# Patient Record
Sex: FEMALE | Race: WHITE | Employment: FULL TIME | URBAN - METROPOLITAN AREA
[De-identification: names, ages, dates, MRNs, and addresses within clinical notes are randomized per-mention and may not be internally consistent; named-entity substitution may affect disease eponyms.]

---

## 2024-07-08 ENCOUNTER — HOSPITAL ENCOUNTER (EMERGENCY)
Facility: HOSPITAL | Age: 24
Discharge: HOME | End: 2024-07-08
Attending: STUDENT IN AN ORGANIZED HEALTH CARE EDUCATION/TRAINING PROGRAM
Payer: COMMERCIAL

## 2024-07-08 VITALS
HEIGHT: 66 IN | DIASTOLIC BLOOD PRESSURE: 83 MMHG | WEIGHT: 165 LBS | HEART RATE: 105 BPM | RESPIRATION RATE: 18 BRPM | SYSTOLIC BLOOD PRESSURE: 128 MMHG | TEMPERATURE: 98.7 F | BODY MASS INDEX: 26.52 KG/M2 | OXYGEN SATURATION: 98 %

## 2024-07-08 DIAGNOSIS — K64.4 EXTERNAL HEMORRHOIDS: Primary | ICD-10-CM

## 2024-07-08 PROCEDURE — 99283 EMERGENCY DEPT VISIT LOW MDM: CPT

## 2024-07-08 RX ORDER — POLYETHYLENE GLYCOL 3350 17 G/17G
17 POWDER, FOR SOLUTION ORAL 2 TIMES DAILY
Qty: 527 G | Refills: 1 | Status: SHIPPED | OUTPATIENT
Start: 2024-07-08 | End: 2024-08-08

## 2024-07-08 RX ORDER — SENNOSIDES 8.6 MG
1 CAPSULE ORAL 2 TIMES DAILY
Qty: 60 CAPSULE | Refills: 0 | Status: SHIPPED | OUTPATIENT
Start: 2024-07-08

## 2024-07-08 ASSESSMENT — ENCOUNTER SYMPTOMS
NAUSEA: 1
EYES NEGATIVE: 1
CONSTIPATION: 1
RESPIRATORY NEGATIVE: 1
BLOOD IN STOOL: 1
DIARRHEA: 0
RECTAL PAIN: 1
VOMITING: 0
ABDOMINAL PAIN: 1

## 2024-07-08 ASSESSMENT — PAIN SCALES - GENERAL: PAINLEVEL_OUTOF10: 6

## 2024-07-08 ASSESSMENT — PAIN DESCRIPTION - LOCATION: LOCATION: ABDOMEN

## 2024-07-08 ASSESSMENT — PAIN - FUNCTIONAL ASSESSMENT: PAIN_FUNCTIONAL_ASSESSMENT: 0-10

## 2024-07-08 ASSESSMENT — PAIN DESCRIPTION - DESCRIPTORS: DESCRIPTORS: ACHING

## 2024-07-08 NOTE — ED PROVIDER NOTES
[facility] EMERGENCY DEPT  EMERGENCY DEPARTMENT ENCOUNTER      Pt Name: Sienna Encarnacion  MRN: [id number]  Birthdate 2000  Date of evaluation: 7/8/2024  Provider: Tia Lawler MD    CHIEF COMPLAINT       Chief Complaint   Patient presents with    Hemorrhoids         HISTORY OF PRESENT ILLNESS   (Location/Symptom, Timing/Onset, Context/Setting, Quality, Duration, Modifying Factors, Severity)  Note limiting factors.   Patient is a 24-year-old female with past medical history of endometriosis who presents to the ED due to rectal bleeding for the past month.  Patient reports bleeding started suddenly and has been progressively getting worse for the past month.  Patient has a bowel movement there is a lot of blood in the toilet bowl and blood on toilet paper when patient wipes.  Patient reports started taking laxatives 2 days ago due to constipation.  Patient reports constipation is likely secondary to not wanting to use restroom due to pain with bowel movement.  Patient reports crampy abdominal pain which is likely due to constipation.  Patient reports never had such rectal bleeding in the past.  Patient noticed a skin tag on self-examination.  Patient denies any any medical GI history including GI surgeries.  Denies any fever, chills, vomiting, lightheadedness, dizziness.  Patient endorses nausea this morning but has not eaten breakfast.  This is the first day that patient has been feeling nauseous.    The history is provided by the patient.         Review of External Medical Records:     Nursing Notes were reviewed.    REVIEW OF SYSTEMS    (2-9 systems for level 4, 10 or more for level 5)     Review of Systems   Constitutional: Negative.    HENT: Negative.     Eyes: Negative.    Respiratory: Negative.     Cardiovascular: Negative.    Gastrointestinal:  Positive for abdominal pain (generalized, cramping), blood in stool, constipation, nausea and rectal pain. Negative for diarrhea and vomiting.   Endocrine:

## 2024-07-08 NOTE — ED TRIAGE NOTES
PT ambulatory to ED with complaints of a hemorrhoid with lots of blood while having BM. PT also reports abdominal pain and cramping.

## 2024-08-27 ENCOUNTER — ANCILLARY PROCEDURE (OUTPATIENT)
Facility: HOSPITAL | Age: 24
End: 2024-08-27
Attending: EMERGENCY MEDICINE
Payer: COMMERCIAL

## 2024-08-27 ENCOUNTER — HOSPITAL ENCOUNTER (EMERGENCY)
Facility: HOSPITAL | Age: 24
Discharge: HOME | End: 2024-08-27
Attending: EMERGENCY MEDICINE
Payer: COMMERCIAL

## 2024-08-27 VITALS
WEIGHT: 160 LBS | HEIGHT: 66 IN | TEMPERATURE: 98 F | RESPIRATION RATE: 18 BRPM | OXYGEN SATURATION: 100 % | HEART RATE: 79 BPM | DIASTOLIC BLOOD PRESSURE: 76 MMHG | SYSTOLIC BLOOD PRESSURE: 124 MMHG | BODY MASS INDEX: 25.71 KG/M2

## 2024-08-27 DIAGNOSIS — O46.90 VAGINAL BLEEDING IN PREGNANCY: Primary | ICD-10-CM

## 2024-08-27 DIAGNOSIS — Z3A.01 LESS THAN 8 WEEKS GESTATION OF PREGNANCY: ICD-10-CM

## 2024-08-27 LAB
ABO + RH BLD: NORMAL
ALBUMIN SERPL-MCNC: 3.9 G/DL (ref 3.5–5.2)
ALBUMIN/GLOB SERPL: 1.5 (ref 1.1–2.2)
ALP SERPL-CCNC: 64 U/L (ref 35–104)
ALT SERPL-CCNC: 19 U/L (ref 10–35)
ANION GAP SERPL CALC-SCNC: 13 MMOL/L (ref 5–15)
APPEARANCE UR: CLEAR
AST SERPL-CCNC: 18 U/L (ref 10–35)
BACTERIA URNS QL MICRO: NEGATIVE /HPF
BASOPHILS # BLD: 0 K/UL (ref 0–1)
BASOPHILS NFR BLD: 0 % (ref 0–1)
BILIRUB SERPL-MCNC: 0.5 MG/DL (ref 0.2–1)
BILIRUB UR QL: NEGATIVE
BLOOD BANK CMNT PATIENT-IMP: NORMAL
BUN SERPL-MCNC: 9 MG/DL (ref 6–20)
BUN/CREAT SERPL: 14 (ref 12–20)
CALCIUM SERPL-MCNC: 9.1 MG/DL (ref 8.6–10)
CHLORIDE SERPL-SCNC: 103 MMOL/L (ref 98–107)
CO2 SERPL-SCNC: 24 MMOL/L (ref 22–29)
COLOR UR: ABNORMAL
CREAT SERPL-MCNC: 0.65 MG/DL (ref 0.5–0.9)
DIFFERENTIAL METHOD BLD: ABNORMAL
EOSINOPHIL # BLD: 0 K/UL (ref 0–0.4)
EOSINOPHIL NFR BLD: 1 %
EPITH CASTS URNS QL MICRO: ABNORMAL /LPF
ERYTHROCYTE [DISTWIDTH] IN BLOOD BY AUTOMATED COUNT: 12.7 % (ref 11.5–14.5)
GLOBULIN SER CALC-MCNC: 2.6 G/DL (ref 2–4)
GLUCOSE SERPL-MCNC: 90 MG/DL (ref 65–100)
GLUCOSE UR STRIP.AUTO-MCNC: NEGATIVE MG/DL
HCG SERPL-ACNC: ABNORMAL MIU/ML
HCT VFR BLD AUTO: 37.5 % (ref 35–47)
HGB BLD-MCNC: 13.5 G/DL (ref 11.5–16)
HGB UR QL STRIP: ABNORMAL
IMM GRANULOCYTES # BLD AUTO: 0 K/UL (ref 0–0.04)
IMM GRANULOCYTES NFR BLD AUTO: 0 % (ref 0–0.5)
KETONES UR QL STRIP.AUTO: NEGATIVE MG/DL
LEUKOCYTE ESTERASE UR QL STRIP.AUTO: NEGATIVE
LYMPHOCYTES # BLD: 3.4 K/UL (ref 0.8–3.5)
LYMPHOCYTES NFR BLD: 41 % (ref 12–49)
MCH RBC QN AUTO: 32.1 PG (ref 26–34)
MCHC RBC AUTO-ENTMCNC: 36 G/DL (ref 30–36.5)
MCV RBC AUTO: 89.1 FL (ref 80–99)
MONOCYTES # BLD: 0.6 K/UL (ref 0–1)
MONOCYTES NFR BLD: 7 % (ref 5–13)
NEUTS SEG # BLD: 4.2 K/UL (ref 1.8–8)
NEUTS SEG NFR BLD: 51 % (ref 32–75)
NITRITE UR QL STRIP.AUTO: NEGATIVE
NRBC # BLD: 0 K/UL (ref 0–0.01)
NRBC BLD-RTO: 0 PER 100 WBC
PH UR STRIP: 7 (ref 5–8)
PLATELET # BLD AUTO: 213 K/UL (ref 150–400)
PMV BLD AUTO: 9.9 FL (ref 8.9–12.9)
POTASSIUM SERPL-SCNC: 4 MMOL/L (ref 3.5–5.1)
PROT SERPL-MCNC: 6.5 G/DL (ref 6.4–8.3)
PROT UR STRIP-MCNC: NEGATIVE MG/DL
RBC # BLD AUTO: 4.21 M/UL (ref 3.8–5.2)
RBC #/AREA URNS HPF: ABNORMAL /HPF
SODIUM SERPL-SCNC: 140 MMOL/L (ref 136–145)
SP GR UR REFRACTOMETRY: 1.01 (ref 1–1.03)
UROBILINOGEN UR QL STRIP.AUTO: 0.2 EU/DL (ref 0.2–1)
WBC # BLD AUTO: 8.2 K/UL (ref 3.6–11)
WBC URNS QL MICRO: ABNORMAL /HPF (ref 0–4)

## 2024-08-27 PROCEDURE — 84702 CHORIONIC GONADOTROPIN TEST: CPT

## 2024-08-27 PROCEDURE — 86900 BLOOD TYPING SEROLOGIC ABO: CPT

## 2024-08-27 PROCEDURE — 36415 COLL VENOUS BLD VENIPUNCTURE: CPT

## 2024-08-27 PROCEDURE — 99284 EMERGENCY DEPT VISIT MOD MDM: CPT

## 2024-08-27 PROCEDURE — 80053 COMPREHEN METABOLIC PANEL: CPT

## 2024-08-27 PROCEDURE — 86901 BLOOD TYPING SEROLOGIC RH(D): CPT

## 2024-08-27 PROCEDURE — 81001 URINALYSIS AUTO W/SCOPE: CPT

## 2024-08-27 PROCEDURE — 85025 COMPLETE CBC W/AUTO DIFF WBC: CPT

## 2024-08-27 ASSESSMENT — PAIN DESCRIPTION - DESCRIPTORS: DESCRIPTORS: CRAMPING

## 2024-08-27 ASSESSMENT — PAIN DESCRIPTION - LOCATION: LOCATION: ABDOMEN

## 2024-08-27 ASSESSMENT — PAIN DESCRIPTION - PAIN TYPE: TYPE: ACUTE PAIN

## 2024-08-27 ASSESSMENT — LIFESTYLE VARIABLES
HOW MANY STANDARD DRINKS CONTAINING ALCOHOL DO YOU HAVE ON A TYPICAL DAY: PATIENT DOES NOT DRINK
HOW OFTEN DO YOU HAVE A DRINK CONTAINING ALCOHOL: NEVER

## 2024-08-27 ASSESSMENT — PAIN SCALES - GENERAL: PAINLEVEL_OUTOF10: 2

## 2024-08-27 ASSESSMENT — PAIN - FUNCTIONAL ASSESSMENT: PAIN_FUNCTIONAL_ASSESSMENT: 0-10

## 2024-08-27 NOTE — ED TRIAGE NOTES
Pt arrived to ED with cc of being 6-7 weeks pregnant and experiencing increased watery vaginal bleeding. Reports she has had off and on spotting since beginning of pregnancy but reports this morning increased leaking streak in bottom of toilet. Reports cramping but endorses endometriosis and reports that the cramping feels her normal cramping. Reports pain 2/10. Denies dysuria or hematuria. Reports nausea but not right this minute. Denies vomiting or diarrhea. No fevers, No medications PTA.

## 2024-08-27 NOTE — ED PROVIDER NOTES
[facility] [place] EMERGENCY DEPT  EMERGENCY DEPARTMENT ENCOUNTER      Pt Name: Sienna Encarnacion  MRN: [id number]  Birthdate 2000  Date of evaluation: 8/27/2024  Provider: Tyrone Campos MD    CHIEF COMPLAINT       Chief Complaint   Patient presents with    Vaginal Bleeding       EMERGENCY DEPARTMENT COURSE and DIFFERENTIAL DIAGNOSIS/MDM:   Medical Decision Making  24-year-old female presenting ER with report of being approximately 6 to 7 weeks pregnant based on last menstrual cycle now having some vaginal bleeding.  Patient reports she has had some mild spotting on and off for last couple days and today noticed increased watery streaks of bright red blood.  Denies any excessive vaginal bleeding or passing of clots or tissue.  Having some intermittent abdominal cramping but nothing sustained.  Having no abdominal pain or cramping at this time.  Denies any fevers or chills.  No nausea or vomiting.  Patient has not had an ultrasound for this pregnancy.  Patient reports her blood type is O+.  No previous pregnancies no previous miscarriage.  Does have history of endometriosis.  Reports mild nausea with the pregnancy but no nausea at this time no vomiting or diarrhea.  On exam patient is well-appearing in no acute distress.  No abdominal pain to palpation.  Well-appearing no pallor.  No CVA tenderness.  Bedside ultrasound revealing positive IUP.  Fetal heartbeat can be visualized but difficult to see with measurement.  Normal hemoglobin level.  Beta-hCG an appropriate level.  No signs of urine tract infection.  I discussed patient's symptoms with bleeding in first trimester pregnancy tach no \"diagnosis/concern for threatened miscarriage however patient evaluation is reassuring at this time.  Discussed follow-up with OB/GYN.  Patient stable for discharge    Amount and/or Complexity of Data Reviewed  Labs: ordered. Decision-making details documented in ED Course.  Radiology: ordered and independent interpretation

## 2024-09-05 LAB
ABO, EXTERNAL RESULT: NORMAL
C. TRACHOMATIS, EXTERNAL RESULT: NEGATIVE
HEP B, EXTERNAL RESULT: NEGATIVE
HEPATITIS C ANTIBODY, EXTERNAL RESULT: NORMAL
HIV, EXTERNAL RESULT: NEGATIVE
N. GONORRHOEAE, EXTERNAL RESULT: NEGATIVE
RH FACTOR, EXTERNAL RESULT: POSITIVE
RUBELLA TITER, EXTERNAL RESULT: NORMAL
T. PALLIDUM (SYPHILIS) ANTIBODY, EXTERNAL RESULT: NORMAL

## 2024-11-20 ENCOUNTER — HOSPITAL ENCOUNTER (EMERGENCY)
Facility: HOSPITAL | Age: 24
Discharge: HOME | End: 2024-11-20
Attending: EMERGENCY MEDICINE
Payer: COMMERCIAL

## 2024-11-20 VITALS
HEIGHT: 66 IN | RESPIRATION RATE: 16 BRPM | WEIGHT: 168 LBS | DIASTOLIC BLOOD PRESSURE: 81 MMHG | OXYGEN SATURATION: 100 % | TEMPERATURE: 99.3 F | SYSTOLIC BLOOD PRESSURE: 128 MMHG | BODY MASS INDEX: 27 KG/M2 | HEART RATE: 99 BPM

## 2024-11-20 DIAGNOSIS — B34.9 VIRAL SYNDROME: Primary | ICD-10-CM

## 2024-11-20 DIAGNOSIS — J02.9 ACUTE PHARYNGITIS, UNSPECIFIED ETIOLOGY: ICD-10-CM

## 2024-11-20 LAB
FLUAV RNA SPEC QL NAA+PROBE: NOT DETECTED
FLUBV RNA SPEC QL NAA+PROBE: NOT DETECTED
S PYO DNA THROAT QL NAA+PROBE: NOT DETECTED
SARS-COV-2 RNA RESP QL NAA+PROBE: NOT DETECTED
SOURCE: NORMAL

## 2024-11-20 PROCEDURE — 99283 EMERGENCY DEPT VISIT LOW MDM: CPT

## 2024-11-20 PROCEDURE — 87636 SARSCOV2 & INF A&B AMP PRB: CPT

## 2024-11-20 PROCEDURE — 87651 STREP A DNA AMP PROBE: CPT

## 2024-11-20 ASSESSMENT — PAIN SCALES - GENERAL: PAINLEVEL_OUTOF10: 0

## 2024-11-20 ASSESSMENT — PAIN - FUNCTIONAL ASSESSMENT: PAIN_FUNCTIONAL_ASSESSMENT: 0-10

## 2024-11-20 NOTE — ED TRIAGE NOTES
Patient arrived ambulatory via POV with cc sore throat, generalized body aches, chills that started yesterday. Patient is 18w5d. Denies chest pain, shortness of breath, n/v/d    Patient reports she was exposed to covid within the past week.

## 2024-11-20 NOTE — ED PROVIDER NOTES
[facility] [place] EMERGENCY DEPT  EMERGENCY DEPARTMENT ENCOUNTER      Pt Name: Sienna Encarnacion  MRN: [id number]  Birthdate 2000  Date of evaluation: 11/20/2024  Provider: Roland Armstrong DO    CHIEF COMPLAINT       Chief Complaint   Patient presents with    Sore Throat    Generalized Body Aches         HISTORY OF PRESENT ILLNESS   (Location/Symptom, Timing/Onset, Context/Setting, Quality, Duration, Modifying Factors, Severity)  Note limiting factors.   25 yo female comes in for flu symptoms. 1-2 days of cough, st, subjective fever and generally not feeling well. 18+ weeks pregnant. Was feeling lightheaded at work earlier.             Review of External Medical Records:     Nursing Notes were reviewed.    REVIEW OF SYSTEMS    (2-9 systems for level 4, 10 or more for level 5)     Review of Systems    Except as noted above the remainder of the review of systems was reviewed and negative.       PAST MEDICAL HISTORY   No past medical history on file.      SURGICAL HISTORY     No past surgical history on file.      CURRENT MEDICATIONS       Discharge Medication List as of 11/20/2024  3:15 PM        CONTINUE these medications which have NOT CHANGED    Details   Sennosides (SENNA) 8.6 MG CAPS Take 1 tablet by mouth in the morning and at bedtime Wean down as tolerated to keep bowel movement soft, Disp-60 capsule, R-0Normal      Lidocaine-Glycerin 5-14.4 % CREA Apply cream to rectum 5-6 times per day as needed for pain, Disp-30 g, R-0Normal      ibuprofen (ADVIL;MOTRIN) 600 MG tablet Take 1 tablet by mouth every 8 hours as needed for Pain, Disp-60 tablet, R-0Normal             ALLERGIES     Patient has no known allergies.    FAMILY HISTORY     No family history on file.       SOCIAL HISTORY       Social History     Socioeconomic History    Marital status: Single   Tobacco Use    Smoking status: Former     Types: Cigarettes    Smokeless tobacco: Never   Vaping Use    Vaping status: Every Day    Substances: Nicotine

## 2025-02-19 ENCOUNTER — HOSPITAL ENCOUNTER (OUTPATIENT)
Facility: HOSPITAL | Age: 25
Setting detail: OBSERVATION
Discharge: HOME | End: 2025-02-19
Attending: STUDENT IN AN ORGANIZED HEALTH CARE EDUCATION/TRAINING PROGRAM | Admitting: STUDENT IN AN ORGANIZED HEALTH CARE EDUCATION/TRAINING PROGRAM
Payer: MEDICAID

## 2025-02-19 VITALS
DIASTOLIC BLOOD PRESSURE: 74 MMHG | SYSTOLIC BLOOD PRESSURE: 133 MMHG | RESPIRATION RATE: 16 BRPM | HEART RATE: 113 BPM | TEMPERATURE: 98.1 F | BODY MASS INDEX: 30.02 KG/M2 | OXYGEN SATURATION: 99 % | WEIGHT: 186 LBS

## 2025-02-19 LAB
APPEARANCE UR: CLEAR
BACTERIA URNS QL MICRO: NEGATIVE /HPF
BILIRUB UR QL: NEGATIVE
COLOR UR: ABNORMAL
EPITH CASTS URNS QL MICRO: ABNORMAL /LPF
GLUCOSE UR STRIP.AUTO-MCNC: NEGATIVE MG/DL
HGB UR QL STRIP: NEGATIVE
KETONES UR QL STRIP.AUTO: NEGATIVE MG/DL
LEUKOCYTE ESTERASE UR QL STRIP.AUTO: NEGATIVE
NITRITE UR QL STRIP.AUTO: NEGATIVE
PH UR STRIP: 7.5 (ref 5–8)
PROT UR STRIP-MCNC: NEGATIVE MG/DL
RBC #/AREA URNS HPF: ABNORMAL /HPF (ref 0–5)
SP GR UR REFRACTOMETRY: 1.02 (ref 1–1.03)
URINE CULTURE IF INDICATED: ABNORMAL
UROBILINOGEN UR QL STRIP.AUTO: 0.2 EU/DL (ref 0.2–1)
WBC URNS QL MICRO: ABNORMAL /HPF (ref 0–4)

## 2025-02-19 PROCEDURE — 2580000003 HC RX 258: Performed by: OBSTETRICS & GYNECOLOGY

## 2025-02-19 PROCEDURE — 81001 URINALYSIS AUTO W/SCOPE: CPT

## 2025-02-19 PROCEDURE — 6360000002 HC RX W HCPCS: Performed by: OBSTETRICS & GYNECOLOGY

## 2025-02-19 PROCEDURE — 96360 HYDRATION IV INFUSION INIT: CPT

## 2025-02-19 PROCEDURE — G0378 HOSPITAL OBSERVATION PER HR: HCPCS

## 2025-02-19 PROCEDURE — 96372 THER/PROPH/DIAG INJ SC/IM: CPT

## 2025-02-19 PROCEDURE — 99214 OFFICE O/P EST MOD 30 MIN: CPT

## 2025-02-19 PROCEDURE — G0379 DIRECT REFER HOSPITAL OBSERV: HCPCS

## 2025-02-19 RX ORDER — TERBUTALINE SULFATE 1 MG/ML
0.25 INJECTION SUBCUTANEOUS ONCE
Status: COMPLETED | OUTPATIENT
Start: 2025-02-19 | End: 2025-02-19

## 2025-02-19 RX ORDER — SODIUM CHLORIDE, SODIUM LACTATE, POTASSIUM CHLORIDE, AND CALCIUM CHLORIDE .6; .31; .03; .02 G/100ML; G/100ML; G/100ML; G/100ML
1000 INJECTION, SOLUTION INTRAVENOUS ONCE
Status: COMPLETED | OUTPATIENT
Start: 2025-02-19 | End: 2025-02-19

## 2025-02-19 RX ORDER — SWAB
1 SWAB, NON-MEDICATED MISCELLANEOUS DAILY
COMMUNITY

## 2025-02-19 RX ADMIN — SODIUM CHLORIDE, POTASSIUM CHLORIDE, SODIUM LACTATE AND CALCIUM CHLORIDE 1000 ML: 600; 310; 30; 20 INJECTION, SOLUTION INTRAVENOUS at 19:01

## 2025-02-19 RX ADMIN — TERBUTALINE SULFATE 0.25 MG: 1 INJECTION, SOLUTION SUBCUTANEOUS at 20:01

## 2025-02-19 NOTE — ED TRIAGE NOTES
AntePartum High Risk Pregnancy Note    Sienna Encarnacion  31w5d    HPI: This is a 23 y/o F  at 31w5d GDM A1 Estimated Date of Delivery: 25 complains of mild vaginal spotting x 1 day. Denied contractions, PROM, fever, chills, decreased fetal movement, sex, or flank pain. Prenatal course with VPW .  ROS: See HPI  PMHX: NKA; no surgeries or medical disorders    Vitals:  Patient Vitals for the past 24 hrs:   BP Temp Temp src Pulse Resp SpO2 Weight   25 1715 -- -- -- -- -- -- 84.4 kg (186 lb)   25 1710 128/78 97.8 °F (36.6 °C) Oral 98 16 97 % --     Temp (24hrs), Av.8 °F (36.6 °C), Min:97.8 °F (36.6 °C), Max:97.8 °F (36.6 °C)    I&O:   No intake/output data recorded.             No intake/output data recorded.    Exam:  Patient without distress.               Abdomen soft, non-tender               Fundus soft and non tender               Right upper quadrant non-tender               Perineum No sign of blood or amniotic fluid               Lower extremities edema No               Vagina- no blood seen      Dilation: 0 cm     Effacement: Long  Not Checked    Station: -3     Uterine Activity: irritability               NST:  Reactive           Labs: No results found for this or any previous visit (from the past 24 hour(s)).    Impression: 1 No evidence of third trimester bleeding at 31 weks 5 days.  2. Uterine irritability-improved.  3 No PTL.    Plan: 1 IV hydration. 3 Followup with VPW in 48 hrs or PRN. 4 Pt given  labor precautions.5. Terbutalene 0.25 given once to reduce irritability. 6 Generous PO hydration at home.    NST Note: The patient had multiple 15 beat accelerations over an hour with frequent fetal movement. FHR baseline 135. Result is reactive.

## 2025-02-19 NOTE — PROGRESS NOTES
1700: Pt arrived from home for complaints of vaginal spotting. Beginning this morning, pt noticed pink discharge when wiping, now dark red discharge noted with wiping. No discharge on underwear between wiping. Denies pain, LOF, contractions, HA, vision changes. Reports active FM. Only complication with the pregnancy is new diagnosis GDM, blood glucose well controlled with diet per patient, monitoring at home.     1725: Dr Jackson notified of pt arrival, TORB for UA with reflex. MD to be bedside to assess pt and perform speculum exam.     1815: Dr Jackson at bedside, spec exam per MD, no blood visualized, cervix closed.     1838: Call from Dr. Jackson, orders for IVFB for frequent contractions on toco, pt reports only mild discomfort occasionally, palpate mild.     1900: Bedside and Verbal shift change report given to Lolis BILLY (oncoming nurse) by Gala BILLY (offgoing nurse). Report included the following information Adult Overview, Intake/Output, MAR, Recent Results, and Med Rec Status.

## 2025-02-20 NOTE — PROGRESS NOTES
1900: Bedside and Verbal shift change report given to Ellie RN (oncoming nurse) by Sonali RN (offgoing nurse). Report included the following information Nurse Handoff Report, Index, Adult Overview, Intake/Output, MAR, Recent Results, and Quality Measures.     1905: Assumed care of pt resting comfortably with father and step-mother at bedside. Endorses positive fetal mov't and vaginal bleeding, scant while wiping. Denies LOF, headaches, vision changes, epigastric pain, and/or N/V. Pt is artis per EFM/toco; however, reports only mild cramping. Abdomen mild to palpation during contractions on toco. LR IVF bolus to gravity currently infusing.    1958: Notified ANDREA Jackson MD that pt rec'd 1L IVF bolus. Contractions still Q3-4 minutes, very mild to palpation, with intermittent crampiness. Verbally instructed to give terb with goal of spacing contractions to Q10 minutes or greater.    2019: ANDREA Jackson MD at bedside discussing d/c w/ pt. Confirms reactive NST - okay to remove pt from monitor.     2033: Reviewed discharge paperwork with pt. Allowed time for questions and clarification, of which none were noted. Agreeable to discharge.

## 2025-02-24 ENCOUNTER — TELEMEDICINE (OUTPATIENT)
Age: 25
End: 2025-02-24
Payer: MEDICAID

## 2025-02-24 DIAGNOSIS — O24.419 GESTATIONAL DIABETES MELLITUS (GDM) IN THIRD TRIMESTER, GESTATIONAL DIABETES METHOD OF CONTROL UNSPECIFIED: Primary | ICD-10-CM

## 2025-02-24 PROCEDURE — 99214 OFFICE O/P EST MOD 30 MIN: CPT

## 2025-02-24 RX ORDER — GLUCOSAMINE HCL/CHONDROITIN SU 500-400 MG
CAPSULE ORAL
Qty: 200 STRIP | Refills: 3 | Status: CANCELLED | OUTPATIENT
Start: 2025-02-24

## 2025-02-24 RX ORDER — AVOBENZONE, HOMOSALATE, OCTISALATE, OCTOCRYLENE 30; 40; 45; 26 MG/ML; MG/ML; MG/ML; MG/ML
CREAM TOPICAL
Qty: 200 EACH | Refills: 3 | Status: CANCELLED | OUTPATIENT
Start: 2025-02-24

## 2025-02-24 RX ORDER — PEN NEEDLE, DIABETIC 31 GX5/16"
NEEDLE, DISPOSABLE MISCELLANEOUS
Qty: 200 EACH | Refills: 3 | Status: CANCELLED | OUTPATIENT
Start: 2025-02-24

## 2025-02-24 RX ORDER — BLOOD-GLUCOSE METER
EACH MISCELLANEOUS
Qty: 1 KIT | Refills: 0 | Status: CANCELLED | OUTPATIENT
Start: 2025-02-24

## 2025-02-24 NOTE — PROGRESS NOTES
Assessment & Plan   ASSESSMENT/PLAN:  1. Gestational diabetes mellitus (GDM) in third trimester, gestational diabetes method of control unspecified    Sienna is a 25 y/o  seen virtually for a new GDM visit.  She denies personal Hx and family Hx diabetes.  She has started checking BG QID and reports overall well controlled with diet. She is to upload values to [employer].  GDM education provided below:    The patient has been newly diagnosed with Gestational Diabetes (GDM). During today’s office visit, verbal and written education was provided on the following topics:   Gestational Diabetes (GDM) - An explanation of the condition, its impact on pregnancy, management strategies. The patient was informed that follow-up postpartum is important to assess blood glucose levels, as there is an increased risk of developing Type 2 Diabetes after GDM diagnosis.    Nutrition and Carbohydrate Choices - Guidance on making healthy food choices, including carb counting and portion sizes.  Self-Administration of Finger Sticks - Instruction on how to properly use a glucose meter to check blood sugars.  Blood Glucose Goals - Target ranges for fasting and postprandial (2 hours after meals) blood glucose levels.  Logging Blood Sugars - The patient was instructed to monitor blood glucose levels 4 times daily (fasting and 2 hours postprandial). The patient is to log and submit blood sugar readings weekly, either via [employer] or in-person during appointments. A [employer] thread was initiated today to facilitate log submission starting next week.  Signs and Symptoms of Hypoglycemia and Hyperglycemia - Education on recognizing the signs of low and high blood sugar levels and when to take action.  Fetal Movement Counts - Written and verbal teaching was provided on how to monitor and track daily fetal movements to ensure the baby is active and healthy. The patient was instructed to call her OB if she has any concerns.   Pre-Eclampsia Signs and

## 2025-02-27 DIAGNOSIS — O24.419 GESTATIONAL DIABETES MELLITUS (GDM) IN THIRD TRIMESTER, GESTATIONAL DIABETES METHOD OF CONTROL UNSPECIFIED: Primary | ICD-10-CM

## 2025-02-27 DIAGNOSIS — Z34.90 PREGNANCY, UNSPECIFIED GESTATIONAL AGE: Primary | ICD-10-CM

## 2025-02-28 ENCOUNTER — ROUTINE PRENATAL (OUTPATIENT)
Age: 25
End: 2025-02-28
Payer: MEDICAID

## 2025-02-28 ENCOUNTER — HOSPITAL ENCOUNTER (OUTPATIENT)
Facility: HOSPITAL | Age: 25
Setting detail: OBSERVATION
Discharge: HOME | End: 2025-02-28
Attending: STUDENT IN AN ORGANIZED HEALTH CARE EDUCATION/TRAINING PROGRAM | Admitting: STUDENT IN AN ORGANIZED HEALTH CARE EDUCATION/TRAINING PROGRAM
Payer: MEDICAID

## 2025-02-28 VITALS — DIASTOLIC BLOOD PRESSURE: 82 MMHG | HEART RATE: 113 BPM | SYSTOLIC BLOOD PRESSURE: 124 MMHG

## 2025-02-28 VITALS
HEART RATE: 88 BPM | DIASTOLIC BLOOD PRESSURE: 80 MMHG | TEMPERATURE: 98.1 F | OXYGEN SATURATION: 98 % | RESPIRATION RATE: 18 BRPM | SYSTOLIC BLOOD PRESSURE: 129 MMHG

## 2025-02-28 DIAGNOSIS — O24.419 GESTATIONAL DIABETES MELLITUS (GDM) IN THIRD TRIMESTER, GESTATIONAL DIABETES METHOD OF CONTROL UNSPECIFIED: ICD-10-CM

## 2025-02-28 PROBLEM — O47.00 PRETERM CONTRACTIONS: Status: ACTIVE | Noted: 2025-02-28

## 2025-02-28 LAB
BASOPHILS # BLD: 0.02 K/UL (ref 0–0.1)
BASOPHILS NFR BLD: 0.2 % (ref 0–1)
DIFFERENTIAL METHOD BLD: ABNORMAL
EOSINOPHIL # BLD: 0.02 K/UL (ref 0–0.4)
EOSINOPHIL NFR BLD: 0.2 % (ref 0–7)
ERYTHROCYTE [DISTWIDTH] IN BLOOD BY AUTOMATED COUNT: 14 % (ref 11.5–14.5)
FIBRONECTIN FETAL VAG QL: NEGATIVE
GLUCOSE BLD STRIP.AUTO-MCNC: 83 MG/DL (ref 65–117)
HCT VFR BLD AUTO: 34.5 % (ref 35–47)
HGB BLD-MCNC: 12 G/DL (ref 11.5–16)
IMM GRANULOCYTES # BLD AUTO: 0.03 K/UL (ref 0–0.04)
IMM GRANULOCYTES NFR BLD AUTO: 0.3 % (ref 0–0.5)
LYMPHOCYTES # BLD: 2.38 K/UL (ref 0.8–3.5)
LYMPHOCYTES NFR BLD: 25.7 % (ref 12–49)
MCH RBC QN AUTO: 30.9 PG (ref 26–34)
MCHC RBC AUTO-ENTMCNC: 34.8 G/DL (ref 30–36.5)
MCV RBC AUTO: 88.9 FL (ref 80–99)
MONOCYTES # BLD: 0.61 K/UL (ref 0–1)
MONOCYTES NFR BLD: 6.6 % (ref 5–13)
NEUTS SEG # BLD: 6.21 K/UL (ref 1.8–8)
NEUTS SEG NFR BLD: 67 % (ref 32–75)
NRBC # BLD: 0 K/UL (ref 0–0.01)
NRBC BLD-RTO: 0 PER 100 WBC
PLATELET # BLD AUTO: 224 K/UL (ref 150–400)
PMV BLD AUTO: 10 FL (ref 8.9–12.9)
RBC # BLD AUTO: 3.88 M/UL (ref 3.8–5.2)
SERVICE CMNT-IMP: NORMAL
WBC # BLD AUTO: 9.3 K/UL (ref 3.6–11)

## 2025-02-28 PROCEDURE — 86901 BLOOD TYPING SEROLOGIC RH(D): CPT

## 2025-02-28 PROCEDURE — 82962 GLUCOSE BLOOD TEST: CPT

## 2025-02-28 PROCEDURE — 96361 HYDRATE IV INFUSION ADD-ON: CPT

## 2025-02-28 PROCEDURE — 82731 ASSAY OF FETAL FIBRONECTIN: CPT

## 2025-02-28 PROCEDURE — 36415 COLL VENOUS BLD VENIPUNCTURE: CPT

## 2025-02-28 PROCEDURE — 99213 OFFICE O/P EST LOW 20 MIN: CPT

## 2025-02-28 PROCEDURE — 6360000002 HC RX W HCPCS: Performed by: OBSTETRICS & GYNECOLOGY

## 2025-02-28 PROCEDURE — 96372 THER/PROPH/DIAG INJ SC/IM: CPT

## 2025-02-28 PROCEDURE — 96360 HYDRATION IV INFUSION INIT: CPT

## 2025-02-28 PROCEDURE — 99203 OFFICE O/P NEW LOW 30 MIN: CPT | Performed by: OBSTETRICS & GYNECOLOGY

## 2025-02-28 PROCEDURE — 76819 FETAL BIOPHYS PROFIL W/O NST: CPT | Performed by: OBSTETRICS & GYNECOLOGY

## 2025-02-28 PROCEDURE — G0379 DIRECT REFER HOSPITAL OBSERV: HCPCS

## 2025-02-28 PROCEDURE — 76811 OB US DETAILED SNGL FETUS: CPT | Performed by: OBSTETRICS & GYNECOLOGY

## 2025-02-28 PROCEDURE — 86850 RBC ANTIBODY SCREEN: CPT

## 2025-02-28 PROCEDURE — 85025 COMPLETE CBC W/AUTO DIFF WBC: CPT

## 2025-02-28 PROCEDURE — 2580000003 HC RX 258: Performed by: STUDENT IN AN ORGANIZED HEALTH CARE EDUCATION/TRAINING PROGRAM

## 2025-02-28 PROCEDURE — 86900 BLOOD TYPING SEROLOGIC ABO: CPT

## 2025-02-28 PROCEDURE — G0378 HOSPITAL OBSERVATION PER HR: HCPCS

## 2025-02-28 RX ORDER — SODIUM CHLORIDE, SODIUM LACTATE, POTASSIUM CHLORIDE, AND CALCIUM CHLORIDE .6; .31; .03; .02 G/100ML; G/100ML; G/100ML; G/100ML
1000 INJECTION, SOLUTION INTRAVENOUS ONCE
Status: COMPLETED | OUTPATIENT
Start: 2025-02-28 | End: 2025-02-28

## 2025-02-28 RX ORDER — TERBUTALINE SULFATE 2.5 MG/1
2.5 TABLET ORAL EVERY 8 HOURS PRN
Qty: 10 TABLET | Refills: 0 | Status: SHIPPED | OUTPATIENT
Start: 2025-02-28 | End: 2025-03-03

## 2025-02-28 RX ORDER — TERBUTALINE SULFATE 1 MG/ML
0.25 INJECTION SUBCUTANEOUS ONCE
Status: COMPLETED | OUTPATIENT
Start: 2025-02-28 | End: 2025-02-28

## 2025-02-28 RX ORDER — SODIUM CHLORIDE, SODIUM LACTATE, POTASSIUM CHLORIDE, CALCIUM CHLORIDE 600; 310; 30; 20 MG/100ML; MG/100ML; MG/100ML; MG/100ML
INJECTION, SOLUTION INTRAVENOUS CONTINUOUS
Status: DISCONTINUED | OUTPATIENT
Start: 2025-02-28 | End: 2025-02-28 | Stop reason: HOSPADM

## 2025-02-28 RX ADMIN — SODIUM CHLORIDE, POTASSIUM CHLORIDE, SODIUM LACTATE AND CALCIUM CHLORIDE 1000 ML: 600; 310; 30; 20 INJECTION, SOLUTION INTRAVENOUS at 14:47

## 2025-02-28 RX ADMIN — TERBUTALINE SULFATE 0.25 MG: 1 INJECTION, SOLUTION SUBCUTANEOUS at 20:18

## 2025-02-28 RX ADMIN — SODIUM CHLORIDE, POTASSIUM CHLORIDE, SODIUM LACTATE AND CALCIUM CHLORIDE: 600; 310; 30; 20 INJECTION, SOLUTION INTRAVENOUS at 16:05

## 2025-02-28 NOTE — H&P
Obstetrics Admission H&P    Sienna Encarnacion  [id number]  2000    Chief Complaint:  Pregnancy and  contractions    HPI: 24 y.o. female 33w0d with Estimated Date of Delivery: 25  Patient came to office after her [facility] visit today for routine prenatal visit and reported having cramping.  Had been on L&D last week for  contractions, got IV hydration and a dose of terbutaline then discharged home.   Was 0.5cm/20/-3 in office  No urinary symptoms, LOF or VB.   Has GDM diet-controlled    ROS:  Pertinent items are noted in HPI.    OB History          1    Para        Term                AB        Living             SAB        IAB        Ectopic        Molar        Multiple        Live Births                  Past Medical History:   Diagnosis Date    Gestational diabetes      Past Surgical History:   Procedure Laterality Date    WISDOM TOOTH EXTRACTION Bilateral 2016     Social History     Socioeconomic History    Marital status: Single     Spouse name: Not on file    Number of children: Not on file    Years of education: Not on file    Highest education level: Not on file   Occupational History    Not on file   Tobacco Use    Smoking status: Former     Types: Cigarettes    Smokeless tobacco: Never   Vaping Use    Vaping status: Every Day    Substances: Nicotine   Substance and Sexual Activity    Alcohol use: Yes     Comment: socal    Drug use: Defer    Sexual activity: Yes     Partners: Male   Other Topics Concern    Not on file   Social History Narrative    Not on file     Social Determinants of Health     Financial Resource Strain: Not on file   Food Insecurity: Not on file   Transportation Needs: Not on file   Physical Activity: Not on file   Stress: Not on file   Social Connections: Not on file   Intimate Partner Violence: Not on file   Housing Stability: Not on file     History reviewed. No pertinent family history.  No Known Allergies  Prior to Admission Medications

## 2025-02-28 NOTE — PROCEDURES
PATIENT: ZACHERY KAUR   -  : 2000   -  DOS:2025   -  INTERPRETING PROVIDER:Jose Manuel Cooper,   Indication  ========    New GDM    Method  ======    Transabdominal ultrasound examination. View: suboptimal due to advanced gestational age and unfavorable fetal position    Dating  ======    LMP on: 2024  GA by LMP 33 w + 1 d  PARKER by LMP: 2025  Previous Ultrasound on: 2024  Type of prior assessment: GA  GA at prior assessment date 7 w + 6 d  GA by previous U/S 33 w + 0 d  PARKER by previous Ultrasound: 2025  Ultrasound examination on: 2025  GA by U/S based upon: AC, BPD, Femur, HC  GA by U/S 34 w + 4 d  PARKER by U/S: 2025  Assigned: based on the LMP, selected on 2025  Assigned GA 33 w + 1 d  Assigned PARKER: 2025    Fetal Growth Overview  =================    Exam date        GA              BPD (mm)          HC (mm)              AC (mm)               FL (mm)             HL (mm)            EFW (g)  2025        33w 1d        86.6     89%        310.1    53%        314.3     96%        64.4    41%        59.4     90%        2489    85%    Fetal Biometry  ============    Standard  BPD 86.6 mm 35w 0d 89% Hadlock  .5 mm 35w 4d 92% Cleve  .1 mm 34w 5d 53% Hadlock  Cerebellum tr 45.1 mm 34w 5d 73% Hill  .3 mm 35w 3d 96% Hadlock  Femur 64.4 mm 33w 2d 41% Hadlock  Humerus 59.4 mm 34w 3d 90% Cleve  EFW 2,489 g 34w 4d 85% Hadlock  EFW (lb) 5 lb  EFW (oz) 8 oz  EFW by: Hadlock (BPD-HC-AC-FL)  Extended   4.2 mm  CM 5.3 mm  5% Nicolaides  Head / Face / Neck  Nasal bone: NOT VISUALIZED  Other Structures   bpm    General Evaluation  ==============    Cardiac activity present.  bpm. Fetal movements: visualized. Presentation: Cephalic  Placenta: Placental site: posterior, appropriate distance from the internal os. Placental edge-to-cervical os distance 8.1 cm  Umbilical cord: Cord vessels: 3 vessel cord. Insertion site: central  Amniotic

## 2025-02-28 NOTE — PROGRESS NOTES
Patient was seen 2/28/2025      Please look under media to view full consult and ultrasound report in [employer].         Jose Manuel Cooper MD  Maternal Fetal Medicine

## 2025-02-28 NOTE — PROGRESS NOTES
1349 pt here for c/o contractions, placed pt on efm monitor.  Pt reports feeling fetal movement and denies lof or vaginal bleeding    1544 contractions noted 2.5-4 post pt voiding, called dr molina, dr molina to call back    1742 pt rating contractions 2/10, states she has to close her eyes and breathe through them, palpation mild.  Dr Hay assessed efm, states may take pt off efm at this time, increase iv fluids to 500 ml/her for 2nd litter.  Pt aware of plan of care.  Removed pt from efm, pt aware to let staff know if contractions increase in regularity or strength.

## 2025-03-01 LAB
ABO + RH BLD: NORMAL
BLOOD GROUP ANTIBODIES SERPL: NORMAL
SPECIMEN EXP DATE BLD: NORMAL

## 2025-03-01 NOTE — PROGRESS NOTES
Pt was noted to have uterine irritability at 33 weeks which resolved with IV fluids and one dose of terbutalene. Cervix was rechecked and remained long/fingertip. FFN was neg. Will proceed to discharge patient at this time. Pt will take terbutalene 2.5mg PO Q8hrs for 3 days to keep contractions suppressed. Will followup with Dr Sampson in 3 days and she will decide if tocolysis needs to continue thereafter.

## 2025-03-01 NOTE — PROGRESS NOTES
1900: Bedside and Verbal shift change report given to CHUYITA ferro (oncoming nurse) by TY Lu (offgoing nurse). Report included the following information Nurse Handoff Report, Adult Overview, Intake/Output, MAR, Recent Results, Med Rec Status, and Event Log.      2009: This RN and Manuel VÁZQUEZ at bedside. SVE unchanged. Plan to give a dose of terb per patient request.     2051: This RN spoke with Manuel VÁZQUEZ. VORB to discharge patient  at this time.     2100: This RN at bedside providing discharge instructions. Opportunity for questions given. No further questions at this time. Patient ambulating off unit at this time.

## 2025-03-02 LAB — GBS, EXTERNAL RESULT: NEGATIVE

## 2025-03-07 ENCOUNTER — OFFICE VISIT (OUTPATIENT)
Age: 25
End: 2025-03-07
Payer: MEDICAID

## 2025-03-07 DIAGNOSIS — O24.415 GESTATIONAL DIABETES MELLITUS (GDM) IN THIRD TRIMESTER CONTROLLED ON ORAL HYPOGLYCEMIC DRUG: Primary | ICD-10-CM

## 2025-03-07 PROCEDURE — G0108 DIAB MANAGE TRN  PER INDIV: HCPCS

## 2025-03-07 NOTE — PROGRESS NOTES
[facility]  Diabetes Self-Management Education & Support Program  Diabetes In Pregnancy Encounter Note      SUMMARY  Gestational diabetes self-care management training was completed related to reducing risks, healthy eating, monitoring, taking medications, physical activity, and coping and support. The participant will return in/on two weeks to continue DSMES healthy eating and monitoring. The participant did identify SMART Goal(s) and will practice knowledge and skills related to reducing risks, healthy eating, monitoring, taking medications, physical activity, and coping and supportto improve gestational Diabetes self-management.        EVALUATION:  Participant states that she was diagnosed with GDM  last month.  She has been managing with diet only.  She is doing a good job keeping her blood glucose levels in target.  She shared that she had been exercising regularly, but that she has been to the hospital twice now for cramping/contractions.  She has a good understanding of carbohydrate foods and how to pair with protein and non-starchy-vegetables.  She has experienced some hypoglycemia, although she states that she does not feel any differently.  She verbalized understanding of hypoglycemia treatment. Demonstrates proper technique with blood glucose monitoring.  She will return in 2 weeks with food/glucose log for review.    day fasting Post B Pre Lunch Post L Pre Dinner Post D bedtime   3/3/2025 74   103  115                                                                    RECOMMENDATIONS:  Use Healthy Plate to create balanced meals, log meals consumed.  Log glucose for fasting and before meals and one or two hours after meals to assess if glucose is in target range.  Follow up with Diabetes educator as scheduled for glucose and meal intake evaluation.  On 3/21/2025     TOPICS DISCUSSED TODAY:  What is gestational diabetes?, How do I keep myself and my baby healthy?, What can I

## 2025-03-14 ENCOUNTER — TELEPHONE (OUTPATIENT)
Age: 25
End: 2025-03-14

## 2025-03-14 NOTE — TELEPHONE ENCOUNTER
Lvm for patient to call office at [phone] to update appt into.   *Need to move appt from room 1 to 3 open space.

## 2025-03-20 ENCOUNTER — ROUTINE PRENATAL (OUTPATIENT)
Age: 25
End: 2025-03-20
Payer: MEDICAID

## 2025-03-20 VITALS — SYSTOLIC BLOOD PRESSURE: 128 MMHG | DIASTOLIC BLOOD PRESSURE: 76 MMHG | HEART RATE: 106 BPM

## 2025-03-20 DIAGNOSIS — O24.419 GESTATIONAL DIABETES MELLITUS (GDM) IN THIRD TRIMESTER, GESTATIONAL DIABETES METHOD OF CONTROL UNSPECIFIED: ICD-10-CM

## 2025-03-20 PROCEDURE — 76815 OB US LIMITED FETUS(S): CPT | Performed by: STUDENT IN AN ORGANIZED HEALTH CARE EDUCATION/TRAINING PROGRAM

## 2025-03-20 PROCEDURE — 59025 FETAL NON-STRESS TEST: CPT | Performed by: STUDENT IN AN ORGANIZED HEALTH CARE EDUCATION/TRAINING PROGRAM

## 2025-03-20 PROCEDURE — 99214 OFFICE O/P EST MOD 30 MIN: CPT | Performed by: STUDENT IN AN ORGANIZED HEALTH CARE EDUCATION/TRAINING PROGRAM

## 2025-03-20 NOTE — PROCEDURES
PATIENT: ZACHERY KAUR   -  : 2000   -  DOS:2025   -  INTERPRETING PROVIDER:Vanessa Pairkh,   Indication  ========    New GDM    Method  ======    External Fetal Monitor and transabdominal ultrasound examination. View: Sufficient    Pregnancy  =========    Rosen pregnancy. Number of fetuses: 1    Dating  ======    LMP on: 2024  GA by LMP 36 w + 0 d  PARKER by LMP: 2025  Previous Ultrasound on: 2024  Type of prior assessment: GA  GA at prior assessment date 7 w + 6 d  GA by previous U/S 35 w + 6 d  PARKER by previous Ultrasound: 2025  Assigned: based on the LMP, selected on 2025  Assigned GA 36 w + 0 d  Assigned PARKER: 2025    General Evaluation  ==============    Cardiac activity present.  bpm. Fetal movements: visualized. Presentation: Cephalic  Placenta: Placental site: posterior, appropriate distance from the internal os  Umbilical cord: Cord vessels: 3 vessel cord    Fetal Anatomy  ===========    Lips: normal  Profile: NOT VISUALIZED  Nose: normal  Face  Coronal face: normal  Nasal bone: NOT VISUALIZED  Palate: SUBOPTIMAL  Maxilla: normal  Mandible: normal  Orbits: normal  4-chamber view: normal  LVOT view: SUBOPTIMAL  Heart / Thorax  Ductal arch view: NOT VISUALIZED  Cardiac rhythm: regular (normal)  Cord insertion: NOT VISUALIZED  Stomach: normal  Kidneys: normal  Bladder: normal  Genitals: NOT VISUALIZED  Cervical spine: SUBOPTIMAL  Thoracic spine: SUBOPTIMAL  Lumbar spine: SUBOPTIMAL  Sacral spine: SUBOPTIMAL  Rt hand: SUBOPTIMAL  Rt fingers: NOT VISUALIZED  Rt foot: SUBOPTIMAL  Rt toes: SUBOPTIMAL  Lt foot: NOT VISUALIZED  Lt toes: NOT VISUALIZED    Amniotic Fluid Assessment  =====================    Amount of AF: normal  MVP 6.3 cm. BEST 21.8 cm. Q1 6.3 cm, Q2 6.2 cm, Q3 4.1 cm, Q4 5.1 cm    Non Stress Test  =============    NST interpretation: reactive. Test duration 20 min. Baseline  bpm. Baseline variability: moderate. Accelerations:

## 2025-03-20 NOTE — PROGRESS NOTES
Provided verbal communication regarding Non-Stress Test (NST), fetal movement counts, labor precautions, and signs and symptoms of pre-eclampsia. Patient verbalized understanding of the information provided. All patient questions were answered

## 2025-03-20 NOTE — PROGRESS NOTES
Please see ultrasound report under Imaging tab or Media tab.  Vanessa Parikh MD  [facility]

## 2025-03-24 ENCOUNTER — HOSPITAL ENCOUNTER (OUTPATIENT)
Facility: HOSPITAL | Age: 25
Discharge: HOME | End: 2025-03-25
Attending: STUDENT IN AN ORGANIZED HEALTH CARE EDUCATION/TRAINING PROGRAM | Admitting: OBSTETRICS & GYNECOLOGY
Payer: MEDICAID

## 2025-03-24 LAB
ALBUMIN SERPL-MCNC: 2.6 G/DL (ref 3.5–5)
ALBUMIN/GLOB SERPL: 0.8 (ref 1.1–2.2)
ALP SERPL-CCNC: 145 U/L (ref 45–117)
ALT SERPL-CCNC: 17 U/L (ref 12–78)
AMORPH CRY URNS QL MICRO: ABNORMAL
ANION GAP SERPL CALC-SCNC: 10 MMOL/L (ref 2–12)
APPEARANCE UR: CLEAR
AST SERPL-CCNC: 17 U/L (ref 15–37)
BACTERIA URNS QL MICRO: NEGATIVE /HPF
BILIRUB SERPL-MCNC: 0.3 MG/DL (ref 0.2–1)
BILIRUB UR QL: NEGATIVE
BUN SERPL-MCNC: 8 MG/DL (ref 6–20)
BUN/CREAT SERPL: 12 (ref 12–20)
CALCIUM SERPL-MCNC: 9.1 MG/DL (ref 8.5–10.1)
CHLORIDE SERPL-SCNC: 109 MMOL/L (ref 97–108)
CO2 SERPL-SCNC: 22 MMOL/L (ref 21–32)
COLOR UR: ABNORMAL
CREAT SERPL-MCNC: 0.65 MG/DL (ref 0.55–1.02)
CREAT UR-MCNC: 121 MG/DL
EPITH CASTS URNS QL MICRO: ABNORMAL /LPF
ERYTHROCYTE [DISTWIDTH] IN BLOOD BY AUTOMATED COUNT: 14.1 % (ref 11.5–14.5)
GLOBULIN SER CALC-MCNC: 3.2 G/DL (ref 2–4)
GLUCOSE SERPL-MCNC: 98 MG/DL (ref 65–100)
GLUCOSE UR STRIP.AUTO-MCNC: NEGATIVE MG/DL
HCT VFR BLD AUTO: 36.7 % (ref 35–47)
HGB BLD-MCNC: 12.6 G/DL (ref 11.5–16)
HGB UR QL STRIP: NEGATIVE
KETONES UR QL STRIP.AUTO: NEGATIVE MG/DL
LEUKOCYTE ESTERASE UR QL STRIP.AUTO: NEGATIVE
MCH RBC QN AUTO: 30.4 PG (ref 26–34)
MCHC RBC AUTO-ENTMCNC: 34.3 G/DL (ref 30–36.5)
MCV RBC AUTO: 88.4 FL (ref 80–99)
NITRITE UR QL STRIP.AUTO: NEGATIVE
NRBC # BLD: 0 K/UL (ref 0–0.01)
NRBC BLD-RTO: 0 PER 100 WBC
PH UR STRIP: 6.5 (ref 5–8)
PLATELET # BLD AUTO: 219 K/UL (ref 150–400)
PMV BLD AUTO: 9.9 FL (ref 8.9–12.9)
POTASSIUM SERPL-SCNC: 3.8 MMOL/L (ref 3.5–5.1)
PROT SERPL-MCNC: 5.8 G/DL (ref 6.4–8.2)
PROT UR STRIP-MCNC: NEGATIVE MG/DL
PROT UR-MCNC: 15 MG/DL (ref 0–11.9)
PROT/CREAT UR-RTO: 0.1
RBC # BLD AUTO: 4.15 M/UL (ref 3.8–5.2)
RBC #/AREA URNS HPF: ABNORMAL /HPF (ref 0–5)
SODIUM SERPL-SCNC: 141 MMOL/L (ref 136–145)
SP GR UR REFRACTOMETRY: 1.01 (ref 1–1.03)
URINE CULTURE IF INDICATED: ABNORMAL
UROBILINOGEN UR QL STRIP.AUTO: 0.2 EU/DL (ref 0.2–1)
WBC # BLD AUTO: 7.9 K/UL (ref 3.6–11)
WBC URNS QL MICRO: ABNORMAL /HPF (ref 0–4)

## 2025-03-24 PROCEDURE — 81001 URINALYSIS AUTO W/SCOPE: CPT

## 2025-03-24 PROCEDURE — 82570 ASSAY OF URINE CREATININE: CPT

## 2025-03-24 PROCEDURE — 6370000000 HC RX 637 (ALT 250 FOR IP): Performed by: STUDENT IN AN ORGANIZED HEALTH CARE EDUCATION/TRAINING PROGRAM

## 2025-03-24 PROCEDURE — 80053 COMPREHEN METABOLIC PANEL: CPT

## 2025-03-24 PROCEDURE — 96360 HYDRATION IV INFUSION INIT: CPT

## 2025-03-24 PROCEDURE — 96361 HYDRATE IV INFUSION ADD-ON: CPT

## 2025-03-24 PROCEDURE — 36415 COLL VENOUS BLD VENIPUNCTURE: CPT

## 2025-03-24 PROCEDURE — 85027 COMPLETE CBC AUTOMATED: CPT

## 2025-03-24 PROCEDURE — 84156 ASSAY OF PROTEIN URINE: CPT

## 2025-03-24 PROCEDURE — 93005 ELECTROCARDIOGRAM TRACING: CPT | Performed by: STUDENT IN AN ORGANIZED HEALTH CARE EDUCATION/TRAINING PROGRAM

## 2025-03-24 PROCEDURE — G0378 HOSPITAL OBSERVATION PER HR: HCPCS

## 2025-03-24 PROCEDURE — 2580000003 HC RX 258: Performed by: STUDENT IN AN ORGANIZED HEALTH CARE EDUCATION/TRAINING PROGRAM

## 2025-03-24 PROCEDURE — G0379 DIRECT REFER HOSPITAL OBSERV: HCPCS

## 2025-03-24 RX ORDER — SODIUM CHLORIDE, SODIUM LACTATE, POTASSIUM CHLORIDE, AND CALCIUM CHLORIDE .6; .31; .03; .02 G/100ML; G/100ML; G/100ML; G/100ML
1000 INJECTION, SOLUTION INTRAVENOUS ONCE
Status: COMPLETED | OUTPATIENT
Start: 2025-03-24 | End: 2025-03-24

## 2025-03-24 RX ORDER — CALCIUM CARBONATE 500 MG/1
500 TABLET, CHEWABLE ORAL 3 TIMES DAILY PRN
Status: DISCONTINUED | OUTPATIENT
Start: 2025-03-24 | End: 2025-03-25 | Stop reason: HOSPADM

## 2025-03-24 RX ORDER — FAMOTIDINE 20 MG/1
20 TABLET, FILM COATED ORAL 2 TIMES DAILY
Status: DISCONTINUED | OUTPATIENT
Start: 2025-03-24 | End: 2025-03-25 | Stop reason: HOSPADM

## 2025-03-24 RX ADMIN — SODIUM CHLORIDE, SODIUM LACTATE, POTASSIUM CHLORIDE, AND CALCIUM CHLORIDE 1000 ML: .6; .31; .03; .02 INJECTION, SOLUTION INTRAVENOUS at 20:55

## 2025-03-24 RX ADMIN — ANTACID TABLETS 500 MG: 500 TABLET, CHEWABLE ORAL at 19:49

## 2025-03-24 RX ADMIN — FAMOTIDINE 20 MG: 20 TABLET, FILM COATED ORAL at 19:35

## 2025-03-24 NOTE — H&P
History & Physical    Name: Sienna Encarnacion MRN: [id number]  SSN: [id number]    YOB: 2000  Age: 24 y.o.  Sex: female        Subjective:     Estimated Date of Delivery: 25    Ms. Encarnacion is a  at 36w3d presenting to L&D for elevated Bps at home and epigastric pain. Notes that she has been having epigastric pain for 1 week. States that it usually resolves on its own after about 30 mins but this time did not improve. Since she felt unwell, she reports she took her BP several times at home. States that Bps were 140s/90s up to 160/90s, which prompted her to call the on-call line.     She notes some dyspnea and mild contractions.    Pregnancy complicated by GDM. States she had pizza for lunch and 2hr postprandial BS was 138.     Pregnancy complicated by:  GDMA1: per MFM note, has had good glycemic control. EFW 85%tile, AC 96%tile; 2489gm at 34wks.    OB History          1    Para        Term                AB        Living             SAB        IAB        Ectopic        Molar        Multiple        Live Births                  Past Medical History:   Diagnosis Date    Gestational diabetes      Past Surgical History:   Procedure Laterality Date    WISDOM TOOTH EXTRACTION Bilateral 2016     Social History     Occupational History    Not on file   Tobacco Use    Smoking status: Former     Types: Cigarettes    Smokeless tobacco: Never   Vaping Use    Vaping status: Every Day    Substances: Nicotine   Substance and Sexual Activity    Alcohol use: Yes     Comment: socal    Drug use: Defer    Sexual activity: Yes     Partners: Male     No family history on file.    No Known Allergies  Prior to Admission medications    Medication Sig Start Date End Date Taking? Authorizing Provider   terbutaline (BRETHINE) 2.5 MG tablet Take 1 tablet by mouth every 8 hours as needed (contractions) 2/28/25 3/3/25  Prakash Jackson Jr., MD   Prenatal Vit-Fe Fumarate-FA (PRENATAL VITAMIN) 28-0.8 MG

## 2025-03-25 VITALS
SYSTOLIC BLOOD PRESSURE: 120 MMHG | RESPIRATION RATE: 16 BRPM | OXYGEN SATURATION: 97 % | DIASTOLIC BLOOD PRESSURE: 61 MMHG | HEART RATE: 74 BPM | TEMPERATURE: 98.4 F

## 2025-03-25 PROCEDURE — G0378 HOSPITAL OBSERVATION PER HR: HCPCS

## 2025-03-25 NOTE — DISCHARGE INSTRUCTIONS
Week 37 of Your Pregnancy: Care Instructions    Most babies are born between 37 and 40 weeks.   This is a good time to pack a bag to take with you to the birth. Then it will be ready to go when you are.     Learn about breastfeeding.  For example, find out about ways to hold your baby to make breastfeeding easier. And think about learning how to pump and store milk.     Know that crying is normal.  It's common for babies to cry 1 to 3 hours a day. Some cry more, and some cry less.     Learn why babies cry.  They may be hungry; have gas; need a diaper change; or feel cold, warm, tired, lonely, or tense. Sometimes they cry for unknown reasons.     Think about what will help you stay calm when your baby cries.  Taking slow, deep breaths can help. So can taking a break. It's okay to put your baby somewhere safe (like their crib) and walk away for a few minutes.     Learn about safe sleep for your baby.  Always put your baby to sleep on their back. Place them alone in a crib or bassinet with a firm, flat surface. Keep soft items like stuffed animals out of the crib.     Learn what to expect with  poop.  Your baby will have their own bowel patterns. Some babies have several bowel movements a day. Some have fewer.     Know that  babies will often have loose, yellow bowel movements.  Formula-fed babies have more formed stools. If your baby's poop looks like pellets, your baby is constipated.   Follow-up care is a key part of your treatment and safety. Be sure to make and go to all appointments, and call your doctor if you are having problems. It's also a good idea to know your test results and keep a list of the medicines you take.  Where can you learn more?  Go to https://www.[employer].net/patientEd and enter N257 to learn more about \"Week 37 of Your Pregnancy: Care Instructions.\"  Current as of: 2024  Content Version: 14.4  © 8171-9363 [facility] [employer].   Care instructions adapted

## 2025-03-25 NOTE — PROGRESS NOTES
Ob Hospitalist    The patient feels better and has been sleeping.    Vitals:    255 25 2104 259 25 2148   BP:  116/71 114/64 111/68   Pulse:  88 81 83   SpO2: 97%         FHR: 135 moderate variability, accelerations present, no decels, cat 1  [place]: irregular contractions     at 36 4/7 wks with no evidence of pre-eclampsia or  labor; GDMA1  -preeclampsia precautions  -fetal kick counts  -f/u with scheduled ob and mfm appointment this Friday

## 2025-03-25 NOTE — PROGRESS NOTES
Pt arrived to L&D with c/o elevated bp x3hr at home and midline upper abdominal pain x1 week that is worse today. Pt states she has a slight headache that recently started. Denies ROM, vaginal bleeding,and confirms fetal movement.    1915 Dr. Keys at bedside to assess pt.    0250 Dr. Urena at bedside to evaluate pt, discharge order received.    0309 Discharge teaching done. Opportunity for questions given. Pt verbalized understanding. Pt left unit ambulatory in stable condition.

## 2025-03-27 LAB
EKG ATRIAL RATE: 100 BPM
EKG DIAGNOSIS: NORMAL
EKG P AXIS: 63 DEGREES
EKG P-R INTERVAL: 126 MS
EKG Q-T INTERVAL: 338 MS
EKG QRS DURATION: 72 MS
EKG QTC CALCULATION (BAZETT): 436 MS
EKG R AXIS: 35 DEGREES
EKG T AXIS: 12 DEGREES
EKG VENTRICULAR RATE: 100 BPM

## 2025-03-28 ENCOUNTER — ROUTINE PRENATAL (OUTPATIENT)
Age: 25
End: 2025-03-28

## 2025-03-28 ENCOUNTER — ROUTINE PRENATAL (OUTPATIENT)
Age: 25
End: 2025-03-28
Payer: MEDICAID

## 2025-03-28 VITALS — SYSTOLIC BLOOD PRESSURE: 121 MMHG | DIASTOLIC BLOOD PRESSURE: 77 MMHG | HEART RATE: 98 BPM

## 2025-03-28 DIAGNOSIS — O36.63X0 EXCESSIVE FETAL GROWTH AFFECTING MANAGEMENT OF PREGNANCY IN THIRD TRIMESTER, SINGLE OR UNSPECIFIED FETUS: Primary | ICD-10-CM

## 2025-03-28 DIAGNOSIS — O36.63X0 EXCESSIVE FETAL GROWTH AFFECTING MANAGEMENT OF PREGNANCY IN THIRD TRIMESTER, SINGLE OR UNSPECIFIED FETUS: ICD-10-CM

## 2025-03-28 DIAGNOSIS — O24.419 GESTATIONAL DIABETES MELLITUS (GDM) IN THIRD TRIMESTER, GESTATIONAL DIABETES METHOD OF CONTROL UNSPECIFIED: ICD-10-CM

## 2025-03-28 DIAGNOSIS — O24.410 DIET CONTROLLED GESTATIONAL DIABETES MELLITUS (GDM) IN THIRD TRIMESTER: Primary | ICD-10-CM

## 2025-03-28 PROCEDURE — 76819 FETAL BIOPHYS PROFIL W/O NST: CPT | Performed by: OBSTETRICS & GYNECOLOGY

## 2025-03-28 PROCEDURE — 76816 OB US FOLLOW-UP PER FETUS: CPT | Performed by: OBSTETRICS & GYNECOLOGY

## 2025-03-28 NOTE — PROGRESS NOTES
Assessment & Plan   ASSESSMENT/PLAN:  1. Diet controlled gestational diabetes mellitus (GDM) in third trimester  2. Excessive fetal growth affecting management of pregnancy in third trimester, single or unspecified fetus     ROSIE is 24 yrs of age,  at 37w 1d.      LGA growth, BPP 8/8 and normal AFV today.      Issues address today:  (1) , Gestational Diabetes:   - Previously counseled.   - Has seen nurse practitioner via virtual visit on  for diabetic education  - Glucose log reviewed: overall controlled with diet. Continue dietary control  - Growth today on accelerated side: EFW at 85%tile and AC > 90%tile  - Reviewed need for 75 gram glucola at 6 weeks postpartum and lifelong risk for Type II DM: 50% risk at 5 years - Recommend diabetes educator consult   - Reviewed importance of BG log and submitting each week to monitor BG (Patient to submit via [facility] or in person visit)   - Recommend serial growth q3-4 weeks from diagnosis with MFM   - Weekly ANT starting at 36 weeks with primary OB   - Delivery between 39.0-40.6  - Kick count instructions, PIH and PTL precautions reviewed.      LGA  Today’s biometry reveals accelerated growth with a large abdomen (AC = >99%). I discussed a large fetus may be constitutional and/or related to maternal gestational diabetes (or diabetes). Advised her to reduce any unnecessary carbohydrate intake. The amniotic fluid volume, and BPP are all normal for the current gestational age. The ultrasound findings were discussed with the patient.   - Discussed although estimation of fetal weight is imprecise, prophylactic  delivery is reasonable to prevent complications from vaginal delivery when the EFW is >/= 5000 grams in the non-diabetic patient and 4500 grams in the diabetic patient. Shared patient decision making is advised.  - ANT per other indications    Recommendations  F/U 1 week to cont ANT     - Recommend serial growth q3-4 weeks from diagnosis with MFM   -

## 2025-03-28 NOTE — PROGRESS NOTES
Patient was seen 3/28/2025      Please look under media to view full consult and ultrasound report in ViewPoint.       Jose Manuel Cooper MD  Maternal Fetal Medicine

## 2025-03-28 NOTE — PROCEDURES
PATIENT: ZACHERY KAUR   -  : 2000   -  DOS:2025   -  INTERPRETING PROVIDER:Jose Manuel Cooper,   Indication  ========    New GDM    Method  ======    Transabdominal ultrasound examination. View: Sufficient    Pregnancy  =========    Rosen pregnancy. Number of fetuses: 1    Dating  ======    LMP on: 2024  GA by LMP 37 w + 1 d  PARKER by LMP: 2025  Previous Ultrasound on: 2024  Type of prior assessment: GA  GA at prior assessment date 7 w + 6 d  GA by previous U/S 37 w + 0 d  PARKER by previous Ultrasound: 2025  Ultrasound examination on: 3/28/2025  GA by U/S based upon: AC, BPD, Femur, HC  GA by U/S 39 w + 0 d  PARKER by U/S: 2025  Assigned: based on the LMP, selected on 2025  Assigned GA 37 w + 1 d  Assigned PARKER: 2025    Fetal Biometry  ============    Standard  BPD 95.9 mm 39w 1d 97% Hadlock  .7 mm -/- >99% Cleve  .1 mm 39w 6d 87% Hadlock  .0 mm 40w 4d >99% Hadlock  Femur 71.1 mm 36w 3d 31% Hadlock  EFW 3,812 g -/- 97% Hadlock  EFW (lb) 8 lb  EFW (oz) 6 oz  EFW by: Hadlock (BPD-HC-AC-FL)  Head / Face / Neck  Nasal bone: NOT VISUALIZED  Other Structures   bpm    General Evaluation  ==============    Cardiac activity present.  bpm. Fetal movements: visualized. Presentation: Cephalic  Placenta: Placental site: posterior, appropriate distance from the internal os  Umbilical cord: Cord vessels: 3 vessel cord  Amniotic fluid: Amount of AF: normal. MVP 6.0 cm. BEST 19.4 cm. Q1 5.1 cm, Q2 3.9 cm, Q3 4.4 cm, Q4 6.0 cm    Fetal Anatomy  ===========    Lips: normal  Profile: NOT VISUALIZED  Nose: normal  Face  Coronal face: normal  Nasal bone: NOT VISUALIZED  Palate: SUBOPTIMAL  Maxilla: normal  Mandible: normal  Orbits: normal  4-chamber view: normal  LVOT view: SUBOPTIMAL  Heart / Thorax  Ductal arch view: NOT VISUALIZED  Cardiac rhythm: regular (normal)  Cord insertion: NOT

## 2025-04-01 ENCOUNTER — HOSPITAL ENCOUNTER (OUTPATIENT)
Facility: HOSPITAL | Age: 25
Discharge: HOME | End: 2025-04-01
Attending: STUDENT IN AN ORGANIZED HEALTH CARE EDUCATION/TRAINING PROGRAM | Admitting: OBSTETRICS & GYNECOLOGY
Payer: MEDICAID

## 2025-04-01 PROCEDURE — G0379 DIRECT REFER HOSPITAL OBSERV: HCPCS

## 2025-04-01 PROCEDURE — 59025 FETAL NON-STRESS TEST: CPT

## 2025-04-01 PROCEDURE — 99202 OFFICE O/P NEW SF 15 MIN: CPT

## 2025-04-01 PROCEDURE — G0378 HOSPITAL OBSERVATION PER HR: HCPCS

## 2025-04-02 VITALS
TEMPERATURE: 98 F | HEART RATE: 73 BPM | SYSTOLIC BLOOD PRESSURE: 110 MMHG | RESPIRATION RATE: 17 BRPM | OXYGEN SATURATION: 98 % | DIASTOLIC BLOOD PRESSURE: 64 MMHG

## 2025-04-02 NOTE — PROGRESS NOTES
2208: Pt arrived to unit. Ambulated independently with steady gait.       2219: Pt placed on monitor and VS taken. Pt reports decreased fetal movement today and no movement since 2100. Pt reports mild ctx starting at 2030, but reports having similar victor m brannon frequently at night and being on her feet a lot today. Pt denies VB or LOF. Pt reports prenatal course complicated by GDM , diet controlled and no other PMH. Pt given button and instructed to press to \" makenzie\" strip when feeling baby move. Pt felt baby move at 2224, see strip for movement leon. Taco ZAPIENM notified of pt arrival and condition.     Taco at bedside to evaluate pt. Pt declined SVE at this time and feels comfortable discharging home now sice feeling frequent fetal movement. All questions answered and pt agrees with POC. CNM to place d/c order.     2330: Pt given d/c instructions and educated by this RN. Pt Verbalizes understanding and all questions answered.     2340: Pt ambulating off the unit at this time with steady gait.

## 2025-04-04 ENCOUNTER — ROUTINE PRENATAL (OUTPATIENT)
Age: 25
End: 2025-04-04
Payer: MEDICAID

## 2025-04-04 VITALS — SYSTOLIC BLOOD PRESSURE: 121 MMHG | OXYGEN SATURATION: 98 % | DIASTOLIC BLOOD PRESSURE: 77 MMHG | HEART RATE: 95 BPM

## 2025-04-04 DIAGNOSIS — O24.419 GESTATIONAL DIABETES MELLITUS (GDM) IN THIRD TRIMESTER, GESTATIONAL DIABETES METHOD OF CONTROL UNSPECIFIED: ICD-10-CM

## 2025-04-04 DIAGNOSIS — O36.63X0 EXCESSIVE FETAL GROWTH AFFECTING MANAGEMENT OF PREGNANCY IN THIRD TRIMESTER, SINGLE OR UNSPECIFIED FETUS: ICD-10-CM

## 2025-04-04 DIAGNOSIS — O24.410 DIET CONTROLLED GESTATIONAL DIABETES MELLITUS (GDM) IN THIRD TRIMESTER: Primary | ICD-10-CM

## 2025-04-04 DIAGNOSIS — O36.63X0 EXCESSIVE FETAL GROWTH AFFECTING MANAGEMENT OF PREGNANCY IN THIRD TRIMESTER, SINGLE OR UNSPECIFIED FETUS: Primary | ICD-10-CM

## 2025-04-04 DIAGNOSIS — O47.9 UTERINE CONTRACTIONS DURING PREGNANCY: ICD-10-CM

## 2025-04-04 PROCEDURE — 76818 FETAL BIOPHYS PROFILE W/NST: CPT | Performed by: OBSTETRICS & GYNECOLOGY

## 2025-04-04 PROCEDURE — 99214 OFFICE O/P EST MOD 30 MIN: CPT

## 2025-04-04 RX ORDER — BLOOD-GLUCOSE METER
KIT MISCELLANEOUS
COMMUNITY
Start: 2025-02-10

## 2025-04-04 RX ORDER — BLOOD SUGAR DIAGNOSTIC
STRIP MISCELLANEOUS
COMMUNITY
Start: 2025-04-03

## 2025-04-04 RX ORDER — LANCETS 33 GAUGE
EACH MISCELLANEOUS
COMMUNITY
Start: 2025-04-03

## 2025-04-04 NOTE — PROCEDURES
PATIENT: ZACHERY KAUR   -  : 2000   -  DOS:2025   -  INTERPRETING PROVIDER:Urban Cueto,   Indication  ========    New GDM    Method  ======    External Fetal Monitor and transabdominal ultrasound examination. View: Sufficient    Pregnancy  =========    Rosen pregnancy. Number of fetuses: 1    Dating  ======    LMP on: 2024  GA by LMP 38 w + 1 d  PARKER by LMP: 2025  Previous Ultrasound on: 2024  Type of prior assessment: GA  GA at prior assessment date 7 w + 6 d  GA by previous U/S 38 w + 0 d  PARKER by previous Ultrasound: 2025  Assigned: based on the LMP, selected on 2025  Assigned GA 38 w + 1 d  Assigned PARKER: 2025    General Evaluation  ==============    Cardiac activity present.  bpm. Fetal movements: visualized. Presentation: Cephalic  Placenta: Placental site: posterior, appropriate distance from the internal os  Umbilical cord: Cord vessels: 3 vessel cord    Fetal Anatomy  ===========    Profile: NOT VISUALIZED  Face  Nasal bone: NOT VISUALIZED  Palate: SUBOPTIMAL  LVOT view: SUBOPTIMAL  Heart / Thorax  Ductal arch view: NOT VISUALIZED  Cord insertion: NOT VISUALIZED  Stomach: normal  Kidneys: normal  Bladder: normal  Genitals: NOT VISUALIZED  Cervical spine: SUBOPTIMAL  Thoracic spine: SUBOPTIMAL  Lumbar spine: SUBOPTIMAL  Sacral spine: SUBOPTIMAL  Rt hand: SUBOPTIMAL  Rt fingers: NOT VISUALIZED  Rt foot: SUBOPTIMAL  Rt toes: SUBOPTIMAL  Lt foot: NOT VISUALIZED  Lt toes: NOT VISUALIZED    Amniotic Fluid Assessment  =====================    Amount of AF: normal  MVP 4.9 cm. BEST 15.7 cm. Q1 3.2 cm, Q2 4.3 cm, Q3 4.9 cm, Q4 3.3 cm    Biophysical Profile  ==============    2: Fetal breathing movements  2: Gross body movements  2: Fetal tone  2: Amniotic fluid volume  NST: non-reactive  8/10 Biophysical profile score    Non Stress Test  =============    NST interpretation: non-reactive. Test duration 40 min. Baseline  bpm. Baseline

## 2025-04-04 NOTE — PROGRESS NOTES
Assessment & Plan   ASSESSMENT/PLAN:  1. Diet controlled gestational diabetes mellitus (GDM) in third trimester  2. Excessive fetal growth affecting management of pregnancy in third trimester, single or unspecified fetus  3. Uterine contractions during pregnancy    ROSIE is 24 yrs of age,  at 38w 1d.      Non-reactive NST, BPP 8/10 (-2 NST), normal AFV today.      Gestational Diabetes:   - Previously counseled.   - Has seen nurse practitioner via virtual visit on  for diabetic education  - Glucose log reviewed: overall controlled with diet. Continue dietary control  - Reviewed need for 75 gram glucola at 6 weeks postpartum and lifelong risk for Type II DM: 50% risk at 5 years - Recommend diabetes educator consult   - Reviewed importance of BG log and submitting each week to monitor BG (Patient to submit via [facility] or in person visit)   - Recommend serial growth q3-4 weeks from diagnosis with MFM   - Weekly ANT starting at 36 weeks with primary OB   - Delivery between 39.0-40.6  - Kick count instructions, PIH and Labor precautions reviewed.      LGA  Biometry reveals accelerated growth with a large abdomen (AC >99). I discussed a large fetus may be constitutional and/or related to maternal gestational diabetes (or diabetes). Advised her to reduce any unnecessary carbohydrate intake. The amniotic fluid volume, and BPP are all normal for the current gestational age. The ultrasound findings were discussed with the patient.   - Discussed although estimation of fetal weight is imprecise, prophylactic  delivery is reasonable to prevent complications from vaginal delivery when the EFW is >/= 5000 grams in the non-diabetic patient and 4500 grams in the diabetic patient. Shared patient decision making is advised.  - ANT per other indications  - EFW does not meet ACOG criteria of 4500 grams to offer discussion of  in patient with GDM     Contractions  - Pt artis Q3-7 minutes. She is comfortable

## 2025-04-07 ENCOUNTER — HOSPITAL ENCOUNTER (INPATIENT)
Facility: HOSPITAL | Age: 25
LOS: 3 days | Discharge: HOME | End: 2025-04-10
Attending: STUDENT IN AN ORGANIZED HEALTH CARE EDUCATION/TRAINING PROGRAM | Admitting: STUDENT IN AN ORGANIZED HEALTH CARE EDUCATION/TRAINING PROGRAM
Payer: MEDICAID

## 2025-04-07 PROBLEM — O42.92 FULL-TERM PREMATURE RUPTURE OF MEMBRANES: Status: ACTIVE | Noted: 2025-04-07

## 2025-04-07 LAB
ABO + RH BLD: NORMAL
AMNISURE, POC: NEGATIVE
BLOOD GROUP ANTIBODIES SERPL: NORMAL
COMMENT:: NORMAL
ERYTHROCYTE [DISTWIDTH] IN BLOOD BY AUTOMATED COUNT: 14 % (ref 11.5–14.5)
GLUCOSE BLD STRIP.AUTO-MCNC: 111 MG/DL (ref 65–117)
GLUCOSE BLD STRIP.AUTO-MCNC: 76 MG/DL (ref 65–117)
HCT VFR BLD AUTO: 40.2 % (ref 35–47)
HGB BLD-MCNC: 13.7 G/DL (ref 11.5–16)
Lab: NORMAL
MCH RBC QN AUTO: 30.6 PG (ref 26–34)
MCHC RBC AUTO-ENTMCNC: 34.1 G/DL (ref 30–36.5)
MCV RBC AUTO: 89.7 FL (ref 80–99)
NEGATIVE QC PASS/FAIL: NORMAL
NRBC # BLD: 0 K/UL (ref 0–0.01)
NRBC BLD-RTO: 0 PER 100 WBC
PLATELET # BLD AUTO: 206 K/UL (ref 150–400)
PMV BLD AUTO: 10.1 FL (ref 8.9–12.9)
POSITIVE QC PASS/FAIL: NORMAL
RBC # BLD AUTO: 4.48 M/UL (ref 3.8–5.2)
SERVICE CMNT-IMP: NORMAL
SERVICE CMNT-IMP: NORMAL
SPECIMEN EXP DATE BLD: NORMAL
SPECIMEN HOLD: NORMAL
WBC # BLD AUTO: 9.3 K/UL (ref 3.6–11)

## 2025-04-07 PROCEDURE — 82962 GLUCOSE BLOOD TEST: CPT

## 2025-04-07 PROCEDURE — G0378 HOSPITAL OBSERVATION PER HR: HCPCS

## 2025-04-07 PROCEDURE — 86901 BLOOD TYPING SEROLOGIC RH(D): CPT

## 2025-04-07 PROCEDURE — 86900 BLOOD TYPING SEROLOGIC ABO: CPT

## 2025-04-07 PROCEDURE — 36415 COLL VENOUS BLD VENIPUNCTURE: CPT

## 2025-04-07 PROCEDURE — 85027 COMPLETE CBC AUTOMATED: CPT

## 2025-04-07 PROCEDURE — 86850 RBC ANTIBODY SCREEN: CPT

## 2025-04-07 PROCEDURE — 1100000000 HC RM PRIVATE

## 2025-04-07 PROCEDURE — G0379 DIRECT REFER HOSPITAL OBSERV: HCPCS

## 2025-04-07 PROCEDURE — 7210000100 HC LABOR FEE PER 1 HR: Performed by: OBSTETRICS & GYNECOLOGY

## 2025-04-07 RX ORDER — DOCUSATE SODIUM 100 MG/1
100 CAPSULE, LIQUID FILLED ORAL 2 TIMES DAILY
Status: DISCONTINUED | OUTPATIENT
Start: 2025-04-07 | End: 2025-04-08 | Stop reason: SDUPTHER

## 2025-04-07 RX ORDER — DEXTROSE MONOHYDRATE 100 MG/ML
INJECTION, SOLUTION INTRAVENOUS CONTINUOUS PRN
Status: DISCONTINUED | OUTPATIENT
Start: 2025-04-07 | End: 2025-04-10 | Stop reason: HOSPADM

## 2025-04-07 RX ORDER — SODIUM CHLORIDE 9 MG/ML
INJECTION, SOLUTION INTRAVENOUS PRN
Status: DISCONTINUED | OUTPATIENT
Start: 2025-04-07 | End: 2025-04-09

## 2025-04-07 RX ORDER — TERBUTALINE SULFATE 1 MG/ML
0.25 INJECTION SUBCUTANEOUS
Status: DISCONTINUED | OUTPATIENT
Start: 2025-04-07 | End: 2025-04-09

## 2025-04-07 RX ORDER — MISOPROSTOL 200 UG/1
400 TABLET ORAL PRN
Status: DISCONTINUED | OUTPATIENT
Start: 2025-04-07 | End: 2025-04-10 | Stop reason: HOSPADM

## 2025-04-07 RX ORDER — SODIUM CHLORIDE, SODIUM LACTATE, POTASSIUM CHLORIDE, AND CALCIUM CHLORIDE .6; .31; .03; .02 G/100ML; G/100ML; G/100ML; G/100ML
1000 INJECTION, SOLUTION INTRAVENOUS PRN
Status: DISCONTINUED | OUTPATIENT
Start: 2025-04-07 | End: 2025-04-10 | Stop reason: HOSPADM

## 2025-04-07 RX ORDER — SODIUM CHLORIDE 0.9 % (FLUSH) 0.9 %
5-40 SYRINGE (ML) INJECTION EVERY 12 HOURS SCHEDULED
Status: DISCONTINUED | OUTPATIENT
Start: 2025-04-07 | End: 2025-04-09

## 2025-04-07 RX ORDER — SODIUM CHLORIDE 0.9 % (FLUSH) 0.9 %
5-40 SYRINGE (ML) INJECTION PRN
Status: DISCONTINUED | OUTPATIENT
Start: 2025-04-07 | End: 2025-04-09

## 2025-04-07 RX ORDER — SODIUM CHLORIDE, SODIUM LACTATE, POTASSIUM CHLORIDE, AND CALCIUM CHLORIDE .6; .31; .03; .02 G/100ML; G/100ML; G/100ML; G/100ML
500 INJECTION, SOLUTION INTRAVENOUS PRN
Status: DISCONTINUED | OUTPATIENT
Start: 2025-04-07 | End: 2025-04-10 | Stop reason: HOSPADM

## 2025-04-07 RX ORDER — ONDANSETRON 4 MG/1
4 TABLET, ORALLY DISINTEGRATING ORAL EVERY 8 HOURS PRN
Status: DISCONTINUED | OUTPATIENT
Start: 2025-04-07 | End: 2025-04-08 | Stop reason: SDUPTHER

## 2025-04-07 RX ORDER — TRANEXAMIC ACID 10 MG/ML
1000 INJECTION, SOLUTION INTRAVENOUS
Status: COMPLETED | OUTPATIENT
Start: 2025-04-07 | End: 2025-04-08

## 2025-04-07 RX ORDER — FENTANYL CITRATE 50 UG/ML
25 INJECTION, SOLUTION INTRAMUSCULAR; INTRAVENOUS
Status: DISCONTINUED | OUTPATIENT
Start: 2025-04-07 | End: 2025-04-09

## 2025-04-07 RX ORDER — CARBOPROST TROMETHAMINE 250 UG/ML
250 INJECTION, SOLUTION INTRAMUSCULAR PRN
Status: DISCONTINUED | OUTPATIENT
Start: 2025-04-07 | End: 2025-04-09

## 2025-04-07 RX ORDER — LIDOCAINE HYDROCHLORIDE 10 MG/ML
30 INJECTION, SOLUTION EPIDURAL; INFILTRATION; INTRACAUDAL; PERINEURAL PRN
Status: DISCONTINUED | OUTPATIENT
Start: 2025-04-07 | End: 2025-04-09

## 2025-04-07 RX ORDER — INSULIN LISPRO 100 [IU]/ML
0-4 INJECTION, SOLUTION INTRAVENOUS; SUBCUTANEOUS EVERY 4 HOURS
Status: DISCONTINUED | OUTPATIENT
Start: 2025-04-07 | End: 2025-04-09

## 2025-04-07 RX ORDER — METHYLERGONOVINE MALEATE 0.2 MG/ML
200 INJECTION INTRAVENOUS PRN
Status: DISCONTINUED | OUTPATIENT
Start: 2025-04-07 | End: 2025-04-10 | Stop reason: HOSPADM

## 2025-04-07 RX ORDER — ONDANSETRON 2 MG/ML
4 INJECTION INTRAMUSCULAR; INTRAVENOUS EVERY 6 HOURS PRN
Status: DISCONTINUED | OUTPATIENT
Start: 2025-04-07 | End: 2025-04-08 | Stop reason: SDUPTHER

## 2025-04-07 RX ORDER — SODIUM CHLORIDE, SODIUM LACTATE, POTASSIUM CHLORIDE, CALCIUM CHLORIDE 600; 310; 30; 20 MG/100ML; MG/100ML; MG/100ML; MG/100ML
INJECTION, SOLUTION INTRAVENOUS CONTINUOUS
Status: DISCONTINUED | OUTPATIENT
Start: 2025-04-07 | End: 2025-04-09

## 2025-04-07 NOTE — PROGRESS NOTES
1215-Pt arrived to L&D, to bathroom to change     1225-Pt reports having leaking of fluid yesterday around 0930 in the morning but thought it was discharge/mucous. Was seen in office today and was 1 cm, fern questionable, pooling and nitrazine positive. Orders received to complete amnisure for verification of ROM.    1237-Amnisure in progress    1255-Dr. Keys called unit, aware of pt. Amnisure negative but pt was reporting pain as it was inserted vaginally so RN unsure if swab was in far enough to catch fluid. Pt artis every 2-7 mins, reports they are painful. Orders received to place pad on pt and allow patient to walk once there is a reactive NST. RN verbalizes understanding.    1331-Pt off EFM and ambulating in hallway    1415-Pt on birthing ball at bedside    1430-Pt back to EFM at this time, wetness noted on pt pad. Will notify Dr. Keys after she finishes with another pt.

## 2025-04-07 NOTE — PROGRESS NOTES
1530: Bedside shift change report given to Danuta RN (oncoming nurse) by RADHA Chris RN (offgoing nurse). Report included the following information Nurse Handoff Report, Intake/Output, MAR, and Recent Results.     1900: Bedside shift change report given to Alex BILLY (oncoming nurse) by Danuta RN (offgoing nurse). Report included the following information Nurse Handoff Report, Intake/Output, MAR, and Recent Results.

## 2025-04-07 NOTE — H&P
History & Physical    Name: Sienna Encarnacion MRN: [id number]  SSN: [id number]    YOB: 2000  Age: 24 y.o.  Sex: female        Subjective:     Estimated Date of Delivery: 25    Ms. Encarnacion is a  at 38w3d presenting to L&D for LOF. Noticed first gush of fluid yesterday around 9:30AM. She notes she has been having continuous leakig of fluid since that time. She was seen in the office where +pooling, +nitrazine, equivocal ferning (cervical vs amniotic). Amnisure here was negative, however RN reports that patient did not tolerate swab well and swab was dry when it was removed. She has been artis regularly for the past 2hrs. She reports contractions are getting more intense    Notes fasting blood sugars have been 70s. Postprandial BS are typically around 90. Today had elevated BS of 160 after a bowl of cereal this morning. Reports this is the highest value she has seen in this pregnancy.    Pregnancy complicated by:  Gestational diabetes: diet controlled. EFW at 37wks 97%tile, AC >99%tile. 3812g  Rubella nonimmune    OB History          1    Para        Term                AB        Living             SAB        IAB        Ectopic        Molar        Multiple        Live Births                  Past Medical History:   Diagnosis Date    Gestational diabetes      Past Surgical History:   Procedure Laterality Date    WISDOM TOOTH EXTRACTION Bilateral 2016     Social History     Occupational History    Not on file   Tobacco Use    Smoking status: Former     Types: Cigarettes    Smokeless tobacco: Never   Vaping Use    Vaping status: Every Day    Substances: Nicotine   Substance and Sexual Activity    Alcohol use: Yes     Comment: socal    Drug use: Defer    Sexual activity: Yes     Partners: Male     No family history on file.    No Known Allergies  Prior to Admission medications    Medication Sig Start Date End Date Taking? Authorizing Provider   Prenatal Vit-Fe Fumarate-FA  (PRENATAL VITAMIN) 28-0.8 MG TABS tablet Take 1 tablet by mouth daily   Yes Provider, MD Pattie   Blood Glucose Monitoring Suppl (ONETOUCH VERIO REFLECT) w/Device KIT TEST BLOOD SUGARS FOUR TIMES A DAY. FIRST TIME FASTING AND THEN 2HRS AFTER MEALS 2/10/25   Pattie Jensen MD   ONETOUCH VERIO strip  4/3/25   Pattie Jensen MD   Lancets (ONETOUCH DELICA PLUS [id number]) MISC  4/3/25   ProviderPattie MD        Review of Systems: Pertinent items are noted in HPI.    Objective:     Vitals:  Vitals:    25 1234 25 1250   BP: 129/76    Pulse: (!) 117    Resp: 16    Temp: 98.3 °F (36.8 °C)    TempSrc: Oral    SpO2: 98%    Weight:  86.5 kg (190 lb 9.6 oz)   Height:  1.676 m (5' 6\")        Physical Exam:  Gen: NAD  EFW 8#  Fetal Heart Rate: Reactive  Baseline: 140 per minute  Variability: moderate  Accelerations: yes  Decelerations: none  Uterine contractions: irregular, every 3-5 minutes    SVE: deferred, /-3 in office    Prenatal Labs:   No components found for: \"OBEXTABORH\", \"OBEXTABSCRN\", \"OBEXTRUBELLA\", \"OBEXTGRBS\", \"OBEXTHBSAG\", \"OBEXTHIV\", \"OBEXTRPR\", \"OBEXTGONORR\", \"OBEXTCHLAM\"     Assessment/Plan:     Plan: 24 y.o.  at 38w3d, admit for PROM    - Rh POS / RNI/ GBS NEG (3/30)   - Fetal well being Cat 1   - Anesthesia PCEA when desired  - Labor plan: plan expectant management at this time. We discussed augmentation of labor if contractions space  - GDM: ordered for q4hr POC BS. Insulin SS ordered  - Suspected macrosomia: Discussed increased risks of shoulder dystocia with macrosomic baby. Reviewed fetal risks, including transient or permanent nerve palsy, clavicular/humerus fracture and in rare cases life threatening infant injury (hypoxic encephaloopathy and death). She desires to proceed with labor.    Keisha Keys MD  [facility]    Signed By:  Keisha Keys MD     2025

## 2025-04-08 ENCOUNTER — ANESTHESIA EVENT (OUTPATIENT)
Facility: HOSPITAL | Age: 25
End: 2025-04-08
Payer: MEDICAID

## 2025-04-08 ENCOUNTER — ANESTHESIA (OUTPATIENT)
Facility: HOSPITAL | Age: 25
End: 2025-04-08
Payer: MEDICAID

## 2025-04-08 LAB
GLUCOSE BLD STRIP.AUTO-MCNC: 106 MG/DL (ref 65–117)
GLUCOSE BLD STRIP.AUTO-MCNC: 65 MG/DL (ref 65–117)
GLUCOSE BLD STRIP.AUTO-MCNC: 75 MG/DL (ref 65–117)
GLUCOSE BLD STRIP.AUTO-MCNC: 75 MG/DL (ref 65–117)
GLUCOSE BLD STRIP.AUTO-MCNC: 76 MG/DL (ref 65–117)
GLUCOSE BLD STRIP.AUTO-MCNC: 78 MG/DL (ref 65–117)
SERVICE CMNT-IMP: NORMAL

## 2025-04-08 PROCEDURE — 82962 GLUCOSE BLOOD TEST: CPT

## 2025-04-08 PROCEDURE — 6360000002 HC RX W HCPCS: Performed by: OBSTETRICS & GYNECOLOGY

## 2025-04-08 PROCEDURE — 3700000025 EPIDURAL BLOCK: Performed by: STUDENT IN AN ORGANIZED HEALTH CARE EDUCATION/TRAINING PROGRAM

## 2025-04-08 PROCEDURE — 2500000003 HC RX 250 WO HCPCS: Performed by: STUDENT IN AN ORGANIZED HEALTH CARE EDUCATION/TRAINING PROGRAM

## 2025-04-08 PROCEDURE — 94761 N-INVAS EAR/PLS OXIMETRY MLT: CPT

## 2025-04-08 PROCEDURE — 7210000100 HC LABOR FEE PER 1 HR: Performed by: OBSTETRICS & GYNECOLOGY

## 2025-04-08 PROCEDURE — 6360000002 HC RX W HCPCS: Performed by: ANESTHESIOLOGY

## 2025-04-08 PROCEDURE — 6360000002 HC RX W HCPCS: Performed by: STUDENT IN AN ORGANIZED HEALTH CARE EDUCATION/TRAINING PROGRAM

## 2025-04-08 PROCEDURE — 00HU33Z INSERTION OF INFUSION DEVICE INTO SPINAL CANAL, PERCUTANEOUS APPROACH: ICD-10-PCS | Performed by: ANESTHESIOLOGY

## 2025-04-08 PROCEDURE — 2500000003 HC RX 250 WO HCPCS: Performed by: ANESTHESIOLOGY

## 2025-04-08 PROCEDURE — 3700000156 HC EPIDURAL ANESTHESIA: Performed by: ANESTHESIOLOGY

## 2025-04-08 PROCEDURE — 7220000101 HC DELIVERY VAGINAL/SINGLE: Performed by: OBSTETRICS & GYNECOLOGY

## 2025-04-08 PROCEDURE — 0DQR0ZZ REPAIR ANAL SPHINCTER, OPEN APPROACH: ICD-10-PCS | Performed by: OBSTETRICS & GYNECOLOGY

## 2025-04-08 PROCEDURE — 6370000000 HC RX 637 (ALT 250 FOR IP): Performed by: STUDENT IN AN ORGANIZED HEALTH CARE EDUCATION/TRAINING PROGRAM

## 2025-04-08 PROCEDURE — 2580000003 HC RX 258: Performed by: STUDENT IN AN ORGANIZED HEALTH CARE EDUCATION/TRAINING PROGRAM

## 2025-04-08 PROCEDURE — 6360000002 HC RX W HCPCS

## 2025-04-08 PROCEDURE — 2580000003 HC RX 258: Performed by: OBSTETRICS & GYNECOLOGY

## 2025-04-08 PROCEDURE — 51702 INSERT TEMP BLADDER CATH: CPT

## 2025-04-08 PROCEDURE — 1120000000 HC RM PRIVATE OB

## 2025-04-08 RX ORDER — LIDOCAINE HYDROCHLORIDE AND EPINEPHRINE 15; 5 MG/ML; UG/ML
INJECTION, SOLUTION EPIDURAL
Status: DISCONTINUED | OUTPATIENT
Start: 2025-04-08 | End: 2025-04-08 | Stop reason: SDUPTHER

## 2025-04-08 RX ORDER — ONDANSETRON 2 MG/ML
4 INJECTION INTRAMUSCULAR; INTRAVENOUS EVERY 6 HOURS PRN
Status: DISCONTINUED | OUTPATIENT
Start: 2025-04-08 | End: 2025-04-10 | Stop reason: HOSPADM

## 2025-04-08 RX ORDER — OXYCODONE HYDROCHLORIDE 5 MG/1
5 TABLET ORAL EVERY 6 HOURS PRN
Status: DISCONTINUED | OUTPATIENT
Start: 2025-04-08 | End: 2025-04-10 | Stop reason: HOSPADM

## 2025-04-08 RX ORDER — POLYETHYLENE GLYCOL 3350 17 G/17G
17 POWDER, FOR SOLUTION ORAL DAILY
Status: DISCONTINUED | OUTPATIENT
Start: 2025-04-08 | End: 2025-04-10 | Stop reason: HOSPADM

## 2025-04-08 RX ORDER — LIDOCAINE HYDROCHLORIDE 20 MG/ML
INJECTION, SOLUTION EPIDURAL; INFILTRATION; INTRACAUDAL; PERINEURAL
Status: DISCONTINUED | OUTPATIENT
Start: 2025-04-08 | End: 2025-04-08 | Stop reason: SDUPTHER

## 2025-04-08 RX ORDER — SODIUM CHLORIDE 0.9 % (FLUSH) 0.9 %
5-40 SYRINGE (ML) INJECTION PRN
Status: DISCONTINUED | OUTPATIENT
Start: 2025-04-08 | End: 2025-04-10 | Stop reason: HOSPADM

## 2025-04-08 RX ORDER — ACETAMINOPHEN 500 MG
1000 TABLET ORAL EVERY 8 HOURS SCHEDULED
Status: DISCONTINUED | OUTPATIENT
Start: 2025-04-08 | End: 2025-04-08

## 2025-04-08 RX ORDER — METRONIDAZOLE 500 MG/1
500 TABLET ORAL EVERY 12 HOURS SCHEDULED
Status: DISCONTINUED | OUTPATIENT
Start: 2025-04-08 | End: 2025-04-09

## 2025-04-08 RX ORDER — MAGNESIUM CARB/ALUMINUM HYDROX 105-160MG
30 TABLET,CHEWABLE ORAL
Status: DISCONTINUED | OUTPATIENT
Start: 2025-04-08 | End: 2025-04-10 | Stop reason: HOSPADM

## 2025-04-08 RX ORDER — SIMETHICONE 80 MG
80 TABLET,CHEWABLE ORAL EVERY 6 HOURS PRN
Status: DISCONTINUED | OUTPATIENT
Start: 2025-04-08 | End: 2025-04-10 | Stop reason: HOSPADM

## 2025-04-08 RX ORDER — NALOXONE HYDROCHLORIDE 0.4 MG/ML
INJECTION, SOLUTION INTRAMUSCULAR; INTRAVENOUS; SUBCUTANEOUS PRN
Status: DISCONTINUED | OUTPATIENT
Start: 2025-04-08 | End: 2025-04-09

## 2025-04-08 RX ORDER — FENTANYL/BUPIVACAINE/NS/PF 2-1250MCG
12 PLASTIC BAG, INJECTION (ML) INJECTION CONTINUOUS
Refills: 0 | Status: DISCONTINUED | OUTPATIENT
Start: 2025-04-08 | End: 2025-04-09

## 2025-04-08 RX ORDER — SODIUM CHLORIDE, SODIUM LACTATE, POTASSIUM CHLORIDE, CALCIUM CHLORIDE 600; 310; 30; 20 MG/100ML; MG/100ML; MG/100ML; MG/100ML
INJECTION, SOLUTION INTRAVENOUS CONTINUOUS
Status: DISCONTINUED | OUTPATIENT
Start: 2025-04-08 | End: 2025-04-09

## 2025-04-08 RX ORDER — ONDANSETRON 2 MG/ML
4 INJECTION INTRAMUSCULAR; INTRAVENOUS EVERY 6 HOURS PRN
Status: DISCONTINUED | OUTPATIENT
Start: 2025-04-08 | End: 2025-04-08 | Stop reason: SDUPTHER

## 2025-04-08 RX ORDER — DOCUSATE SODIUM 100 MG/1
100 CAPSULE, LIQUID FILLED ORAL 2 TIMES DAILY
Status: DISCONTINUED | OUTPATIENT
Start: 2025-04-08 | End: 2025-04-10 | Stop reason: HOSPADM

## 2025-04-08 RX ORDER — ACETAMINOPHEN 500 MG
1000 TABLET ORAL EVERY 8 HOURS SCHEDULED
Status: DISCONTINUED | OUTPATIENT
Start: 2025-04-08 | End: 2025-04-10 | Stop reason: HOSPADM

## 2025-04-08 RX ORDER — SODIUM CHLORIDE 9 MG/ML
INJECTION, SOLUTION INTRAVENOUS PRN
Status: DISCONTINUED | OUTPATIENT
Start: 2025-04-08 | End: 2025-04-10 | Stop reason: HOSPADM

## 2025-04-08 RX ORDER — SODIUM CHLORIDE 0.9 % (FLUSH) 0.9 %
5-40 SYRINGE (ML) INJECTION EVERY 12 HOURS SCHEDULED
Status: DISCONTINUED | OUTPATIENT
Start: 2025-04-08 | End: 2025-04-10 | Stop reason: HOSPADM

## 2025-04-08 RX ORDER — ONDANSETRON 4 MG/1
4 TABLET, ORALLY DISINTEGRATING ORAL EVERY 6 HOURS PRN
Status: DISCONTINUED | OUTPATIENT
Start: 2025-04-08 | End: 2025-04-10 | Stop reason: HOSPADM

## 2025-04-08 RX ORDER — EPHEDRINE SULFATE/0.9% NACL/PF 25 MG/5 ML
10 SYRINGE (ML) INTRAVENOUS EVERY 5 MIN PRN
Status: DISCONTINUED | OUTPATIENT
Start: 2025-04-08 | End: 2025-04-10 | Stop reason: HOSPADM

## 2025-04-08 RX ORDER — IBUPROFEN 800 MG/1
800 TABLET, FILM COATED ORAL EVERY 8 HOURS
Status: DISCONTINUED | OUTPATIENT
Start: 2025-04-08 | End: 2025-04-10 | Stop reason: HOSPADM

## 2025-04-08 RX ADMIN — LIDOCAINE HYDROCHLORIDE AND EPINEPHRINE 3 ML: 15; 5 INJECTION, SOLUTION EPIDURAL; INFILTRATION; INTRACAUDAL; PERINEURAL at 03:20

## 2025-04-08 RX ADMIN — METRONIDAZOLE 500 MG: 500 TABLET ORAL at 21:35

## 2025-04-08 RX ADMIN — LIDOCAINE HYDROCHLORIDE AND EPINEPHRINE 4.5 ML: 15; 5 INJECTION, SOLUTION EPIDURAL; INFILTRATION; INTRACAUDAL; PERINEURAL at 03:25

## 2025-04-08 RX ADMIN — SODIUM CHLORIDE, PRESERVATIVE FREE 5 ML: 5 INJECTION INTRAVENOUS at 21:00

## 2025-04-08 RX ADMIN — Medication 12 ML/HR: at 15:30

## 2025-04-08 RX ADMIN — ONDANSETRON 4 MG: 2 INJECTION, SOLUTION INTRAMUSCULAR; INTRAVENOUS at 04:03

## 2025-04-08 RX ADMIN — SODIUM CHLORIDE, SODIUM LACTATE, POTASSIUM CHLORIDE, AND CALCIUM CHLORIDE 1000 ML: .6; .31; .03; .02 INJECTION, SOLUTION INTRAVENOUS at 02:34

## 2025-04-08 RX ADMIN — ACETAMINOPHEN 1000 MG: 500 TABLET ORAL at 18:50

## 2025-04-08 RX ADMIN — IBUPROFEN 800 MG: 800 TABLET, FILM COATED ORAL at 17:41

## 2025-04-08 RX ADMIN — SODIUM CHLORIDE 1500 MG: 9 INJECTION, SOLUTION INTRAVENOUS at 16:40

## 2025-04-08 RX ADMIN — LIDOCAINE HYDROCHLORIDE 3 ML: 20 INJECTION, SOLUTION EPIDURAL; INFILTRATION; INTRACAUDAL at 15:57

## 2025-04-08 RX ADMIN — SODIUM CHLORIDE, SODIUM LACTATE, POTASSIUM CHLORIDE, AND CALCIUM CHLORIDE: .6; .31; .03; .02 INJECTION, SOLUTION INTRAVENOUS at 10:05

## 2025-04-08 RX ADMIN — Medication 12 ML/HR: at 09:30

## 2025-04-08 RX ADMIN — ONDANSETRON 4 MG: 2 INJECTION, SOLUTION INTRAMUSCULAR; INTRAVENOUS at 17:13

## 2025-04-08 RX ADMIN — LIDOCAINE HYDROCHLORIDE 2 ML: 20 INJECTION, SOLUTION EPIDURAL; INFILTRATION; INTRACAUDAL at 16:00

## 2025-04-08 RX ADMIN — DOCUSATE SODIUM 100 MG: 100 CAPSULE, LIQUID FILLED ORAL at 21:02

## 2025-04-08 RX ADMIN — TRANEXAMIC ACID 1000 MG: 10 INJECTION, SOLUTION INTRAVENOUS at 16:17

## 2025-04-08 RX ADMIN — Medication 12 ML/HR: at 03:56

## 2025-04-08 RX ADMIN — OXYCODONE HYDROCHLORIDE 5 MG: 5 TABLET ORAL at 21:02

## 2025-04-08 RX ADMIN — AMOXICILLIN AND CLAVULANATE POTASSIUM 1 TABLET: 875; 125 TABLET, FILM COATED ORAL at 21:35

## 2025-04-08 RX ADMIN — SODIUM CHLORIDE, SODIUM LACTATE, POTASSIUM CHLORIDE, AND CALCIUM CHLORIDE: .6; .31; .03; .02 INJECTION, SOLUTION INTRAVENOUS at 04:54

## 2025-04-08 RX ADMIN — Medication 2 MILLI-UNITS/MIN: at 05:13

## 2025-04-08 RX ADMIN — METHYLERGONOVINE MALEATE 200 MCG: 0.2 INJECTION, SOLUTION INTRAMUSCULAR; INTRAVENOUS at 16:16

## 2025-04-08 ASSESSMENT — PAIN - FUNCTIONAL ASSESSMENT
PAIN_FUNCTIONAL_ASSESSMENT: ACTIVITIES ARE NOT PREVENTED
PAIN_FUNCTIONAL_ASSESSMENT: ACTIVITIES ARE NOT PREVENTED

## 2025-04-08 ASSESSMENT — PAIN DESCRIPTION - LOCATION
LOCATION: HEAD
LOCATION: VAGINA
LOCATION: VAGINA

## 2025-04-08 ASSESSMENT — PAIN DESCRIPTION - DESCRIPTORS
DESCRIPTORS: ACHING
DESCRIPTORS: ACHING;DISCOMFORT

## 2025-04-08 ASSESSMENT — PAIN DESCRIPTION - ORIENTATION
ORIENTATION: MID
ORIENTATION: ANTERIOR

## 2025-04-08 ASSESSMENT — PAIN SCALES - GENERAL
PAINLEVEL_OUTOF10: 7
PAINLEVEL_OUTOF10: 6
PAINLEVEL_OUTOF10: 8

## 2025-04-08 NOTE — DISCHARGE INSTRUCTIONS
Discharge Instructions for Vaginal Delivery    Patient ID:  Sienna Encarnacion  [id number]  24 y.o.  2000    Take Home Medications       Continue taking your prenatal vitamins if you are breastfeeding.    Follow-up care is a key part of your treatment and safety. Please schedule and keep appointments.  Follow-up with your primary OB in 6 weeks.    Activity  Avoid anything in your vagina for 6 weeks (no intercourse, tampons, or douching).  You may drive unless you are taking prescription pain medications.  Climbing stairs and light lifting are okay.  Please avoid excessive exercise, though walking is okay- you'll be tired!    Diet  Regular diet as tolerated.  Be sure to drink plenty of fluids if you are breastfeeding.    Wound care  If you have stitches, continue to rinse with a squirt bottle of warm water each time you void for about 7-10 days..  Your stitches will gradually dissolve over four to eight weeks.  Sitz baths are also helpful to keep the wound clean, encourage healing, and to help with pain associated with the stitches or hemorrhoids.  You can use either a sitz bath basin or a bathtub filled with 2-3\" inches of plain warm water.  Soak for 10 minutes 3 times a day as tolerated.    Pain Management  Over the counter medications such as Tylenol and ibuprofen (Motrin or Advil) are ideal.  These may be taken together, alternating doses.  You may  take the maximum dose:  Motrin or Advil (generic ibuprofen), either 3 tablets every 6 hours or 4 tablets every 8 hours or Tylenol (acetominophen) 1000mg every 6 hours (equivalent to 2 extra strength Tylenol).  You may also have a precrescription for stronger pain medication.  Take only as needed and transition to over the counter medication in the next few days. Minimize amounts of the prescription medication, as it can be habit-forming and will worsen or cause constipation. Most patients will find that within a couple of days, their pain is adequately  down.  Consistent pain that does not get better after you take pain medicine.  Sudden chest pain and shortness of breath  Signs of a blood clot: pain/ swelling/ increasing redness in your lower extremeties  Signs of infection: increased pain in your abdomen or vaginal area; red streaks, warmth, or tenderness of your breasts; fever of 100.5 F or greater

## 2025-04-08 NOTE — PROGRESS NOTES
0510 Assumed care of pt at this time from JULIA Castillo RN.    1113 Bedside shift change report given to JULIA Vigil RN (oncoming nurse) by JEREMIAH Antony RN (offgoing nurse). Report included the following information Nurse Handoff Report, Index, Intake/Output, MAR, and Recent Results.

## 2025-04-08 NOTE — PROGRESS NOTES
Labor Progress Note  Patient seen, fetal heart rate and contraction pattern evaluated, patient examined. Patient has been pushing for >2 hours with minimal to no change in fetal station over the last hour.  She says she is feeling tired.     No data found.    Physical Exam:  Uterine Activity: q 2 minutes on pitocin 6mu  Fetal Heart Rate: category I  C/c/+3    Assessment/Plan:  Reassuring fetal status  Discussed with patient no change with strong maternal pushing effort over the last hour and increased molding noted on exam  Talked with patient that her exam is favorable for forceps assisted vaginal delivery should she choose to proceed, and she requested to speak with Dr. Hair regarding this in greater detail.  Dr. Thompson called to bedside for further discussion.    Michelle Sampson MD

## 2025-04-08 NOTE — ANESTHESIA PROCEDURE NOTES
Epidural Block    Patient location during procedure: OB  Start time: 4/8/2025 3:14 AM  End time: 4/8/2025 3:20 AM  Reason for block: labor epidural  Staffing  Anesthesiologist: John Casanova MD  Performed by: John Casanova MD  Authorized by: John Casanova MD    Epidural  Patient position: sitting  Prep: Betadine  Patient monitoring: frequent blood pressure checks and continuous pulse ox  Approach: midline  Location: L3-4  Injection technique: APRIL air  Provider prep: mask and sterile gloves  Needle  Needle type: Tuohy   Needle gauge: 17 G  Needle length: 3.5 in  Needle insertion depth: 5 cm  Catheter type: multi-orifice  Catheter size: 20 G  Catheter at skin depth: 9.5 cm  Test dose: negativeCatheter Secured: tegaderm and tape  Assessment  Hemodynamics: stable  Attempts: 1  Outcomes: patient tolerated procedure well  Additional Notes  Pt voiced immediate relief   Tolerated well  PCEA explained AVU   See TD for BP/HR/Sats/FHT all ok   Preanesthetic Checklist  Completed: patient identified, IV checked, site marked, risks and benefits discussed, surgical/procedural consents, equipment checked, pre-op evaluation, timeout performed, anesthesia consent given, oxygen available, monitors applied/VS acknowledged, fire risk safety assessment completed and verbalized and blood product R/B/A discussed and consented

## 2025-04-08 NOTE — PROGRESS NOTES
Labor Progress Note  Patient seen, fetal heart rate and contraction pattern evaluated, patient examined.  Patient is comfortable with epidural.    Relevant history:  24 y.o.  pushing for an hour.      EGA: 38w4d    Physical Exam:  Blood pressure (!) 101/56, pulse (!) 104, temperature 98.8 °F (37.1 °C), temperature source Axillary, resp. rate 16, height 1.676 m (5' 6\"), weight 86.5 kg (190 lb 9.6 oz), SpO2 98%.      24 y.o.  female in no acute distress.    Uterine Activity:   Frequency: Every 2 - 3 minutes   Duration: 60 seconds   Intensity: moderate  Fetal Heart Rate:    Baseline: 120 bpm   Variability: Moderate   Accelerations: Absent   Decelerations: Variable  Category: 2    EFW: 8 pounds    Pelvis:      Diagonal conjugate: Cannot be assessed      Retropubic angle: RBT_RetropubicAngle: Rounded and wide      Sidewalls: RBT_Sidewalls: Straight     Sacrum: Average width, curvature, and inclination     Ischial spines: Blunt     Sacrospinous ligament: 3 fingerbreadths     Pubic Arch: Wide and round > 90 degrees     Bituberous diameter: > 8cm     Pelvis Overall: Adequate    Cervix: Fully dilated and retracted    Membranes: Ruptured    Amniotic Fluid: Clear    Is the fetal head engaged? Yes, 0 fifths palpable    Station: +3    Angle of Progression: 154 Degrees = Station 3.5 cm    Fetal head position: Right occiput anterior    Asynclitism: Anterior    Caput: +3    Molding: +2 parietal bones are overlapped, but easily reduced    Fetal spine location: Maternal right    Fetal position confirmed with ultrasound: Yes    Recent Results (from the past 12 hours)   POCT Glucose    Collection Time: 25  3:53 AM   Result Value Ref Range    POC Glucose 76 65 - 117 mg/dL    Performed by: SHAKIR DAMIAN    POCT Glucose    Collection Time: 25  8:53 AM   Result Value Ref Range    POC Glucose 75 65 - 117 mg/dL    Performed by: Titi Tinoco    POCT Glucose    Collection Time: 25 12:23 PM   Result Value Ref Range     POC Glucose 65 65 - 117 mg/dL    Performed by: Yaritza Tsai          Assessment/Plan:    PROM   Gestational diabetes diet controlled  38 weeks  AC 97th percentile    She is pushing well, and progressing.  I recommend continue pushing.    Jose Manuel Hair MD  4/8/2025

## 2025-04-08 NOTE — PROGRESS NOTES
Labor Note    Sienna Encarnacion  [id number]  2000   38w4d      S:  Coping well with irreg ctx s/p epidural placement. Denies new c/o.    O:    BP (!) 99/53   Pulse 78   Temp 98.5 °F (36.9 °C)   Resp 15   Ht 1.676 m (5' 6\")   Wt 86.5 kg (190 lb 9.6 oz)   SpO2 99%   BMI 30.76 kg/m²        SVE: Deferred, recent exam by primary RN 3/80/-2, cephalic, membranes ruptured-continues to leak clear fluid.    FHT reviewed and remains CAT I   Baseline:135 bpm  Moderate variability  Accels: present  Decels: None  Ctx/[place]:Irregular, 2-5 min, moderate to palpation. Resting tone soft between.    A/P:  24 y.o.  @ 38w4d - admitted with PROM. Pregnancy course complicated by GDMA1 and suspected fetal macrosomia.   - GBS Neg  - CAT I FHT    CNM at bedside for pt eval after delay due to other patient care.   Doing well, VSS, comfortable with epidural.  Reviewed FHT/Ctx pattern. Rec made for pitocin augmentation with r/b/a. Pt verbalized understanding and is agreeable to POC.   Encouraged rest and frequent position changes using peanut ball.      - FWB:  CEFM/[place]  - Labor: Pitocin per unit protocol  - Labor pain mgmt: Epidural infusing  - Anticipate .      Signed:    JAIR Hart-KISHA

## 2025-04-08 NOTE — PROGRESS NOTES
Labor Progress Note  Patient seen, fetal heart rate and contraction pattern evaluated, patient examined.  Patient is comfortable with epidural.    Relevant history:  24 y.o.  pushing for 2 hours, with no descent for the past hour.    EGA: 38w4d    Physical Exam:  Blood pressure (!) 101/56, pulse (!) 104, temperature 98.8 °F (37.1 °C), temperature source Axillary, resp. rate 16, height 1.676 m (5' 6\"), weight 86.5 kg (190 lb 9.6 oz), SpO2 98%.      24 y.o.  female in no acute distress.    Uterine Activity:   Frequency: Every 2 - 3 minutes   Duration: 60 seconds   Intensity: moderate  Fetal Heart Rate:    Baseline: 130 bpm   Variability: Moderate   Accelerations: Absent   Decelerations: Variable  Category: 2    EFW: 8 pounds    Pelvis:      Diagonal conjugate: Cannot be assessed      Retropubic angle: RBT_RetropubicAngle: Rounded and wide      Sidewalls: RBT_Sidewalls: Straight     Sacrum: Average width, curvature, and inclination     Ischial spines: Blunt     Sacrospinous ligament: 3 fingerbreadths     Pubic Arch: Wide and round > 90 degrees     Bituberous diameter: > 8cm     Pelvis Overall: Adequate    Cervix: Fully dilated and retracted    Membranes: Ruptured    Amniotic Fluid: Clear    Is the fetal head engaged? Yes,0 fifths palpable    Station: +3    Angle of Progression: 154 Degrees = Station 3.5 cm    Fetal head position: Right occiput anterior    Asynclitism: Anterior    Caput: +3    Molding: +2 parietal bones are overlapped, but easily reduced    Fetal spine location: Maternal right    Fetal position confirmed with ultrasound: Yes    Recent Results (from the past 12 hours)   POCT Glucose    Collection Time: 25  8:53 AM   Result Value Ref Range    POC Glucose 75 65 - 117 mg/dL    Performed by: Titi Tinoco    POCT Glucose    Collection Time: 25 12:23 PM   Result Value Ref Range    POC Glucose 65 65 - 117 mg/dL    Performed by: Yaritza Tsai    POCT Glucose    Collection Time:  25  3:20 PM   Result Value Ref Range    POC Glucose 78 65 - 117 mg/dL    Performed by: Rickie Garcia          Assessment/Plan:    Patient Active Problem List   Diagnosis     contractions    Full-term premature rupture of membranes         Indication: Maternal exhaustion    Contraindication: None No fetal osetogenesis imperfecta, thrombocytopenia, hemophilia, or von Willebrand disease    Location for procedure: Delivery room    I reviewed the procedure, benefits (avoiding  delivery), and risks including failure,  infection, perineal, vaginal, and cervical lacerations, injury to the urinary bladder, hemorrhage, forcep marks on the baby, injury to the baby ( including facial lacerations, facial nerve palsy, skull fracture, intracranial hemorrhage), and hemorrhage.      Vacuum assisted vaginal birth and  birth are the alternatives to a forceps assisted vaginal birth.      Vacuum assisted vaginal birth trials have a greater likelihood of failure, compared with a  trial of forceps assisted birth.  The risk of perineal and  injury is the same when comparing vacuum and forceps assisted vaginal birth.      Long term fetal outcomes are the same as for second stage  delivery.   is the alternative to a trial of forceps.    The risk of intracranial hemorrhage is the same whether the delivery is by forceps, vacuum assisted, or .    The trial of forceps will be abandoned if I cannot get a good application of the forceps, not making progess with each pull, or it takes longer than fifteen minutes.    I will proceed with  if the trial of forceps fails.  I reviewed  delivery, benefits, and risks including, infection, incisional problems, injury to adjacent organs, bleeding, and medical complications.   in the second stage of labor increases the risk of uterine, cervical, and vaginal lacerations, hemorrhage, and injury to the bladder.  It may also

## 2025-04-08 NOTE — PROGRESS NOTES
1113: Bedside shift change report given to Danuta RN (oncoming nurse) by Jonnie RN (offgoing nurse). Report included the following information Nurse Handoff Report, Intake/Output, MAR, Recent Results, and Med Rec Status.     1223: BG 65. Patient asymptomatic. Provided her 1 cup of apple juice.     1320: patient c/o increased rectal pain. Notified Dr Sampson via perfectserve. Per MD, RN can check cervix.    1325: SVE per RN c/c/+1. Notified Dr Sampson who states patient can start pushing.    1332: Patient actively pushing.  RN remains in continuous attendance at the bedside.  Assessment & evaluation of fetal heart rate ongoing via continuous EFM.    1430: Dr Sampson at bedside to assess delivery status.    1441: Dr Sampson performing bedside US to verify fetal position. Baby OA. Per MD, continue to push.    1527: Dr Sampson at bedside to reassess delivery status. Due to caput and maternal fatigue, MD discussed using forceps for delivery. Dr Sampson consulted Dr Hair.    1545: Dr Hair at bedside discussing forcep assisted delivery with patient and partner. Risks/benefits discussed by MD and patient verbalized understanding.  Straight cath performed and drained 25 mL.     1614: RN remained at bedside throughout pushing.  EFM continuously assessed.  Vaginal delivery of viable male infant.    1811: contacted Dr Hair about small amount of blood trickling with fundal assessment. MD called to another delivery and will come assess afterwards.     1900: Bedside shift change report given to LISBETH Martínez RN (oncoming nurse) by Danuta RN (offgoing nurse). Report included the following information Nurse Handoff Report, Adult Overview, Intake/Output, MAR, and Recent Results.

## 2025-04-08 NOTE — ANESTHESIA POSTPROCEDURE EVALUATION
Department of Anesthesiology  Postprocedure Note    Patient: Sienna Encarnacion  MRN: [id number]  YOB: 2000  Date of evaluation: 4/8/2025    Procedure Summary       Date: 04/08/25 Room / Location:     Anesthesia Start: 0314 Anesthesia Stop: 1614    Procedure: Labor Analgesia Diagnosis:     Scheduled Providers:  Responsible Provider: Shivam Red MD    Anesthesia Type: epidural ASA Status: 2 - Emergent            Anesthesia Type: No value filed.    Darshan Phase I:      Darshan Phase II:      Anesthesia Post Evaluation    No notable events documented.

## 2025-04-08 NOTE — ANESTHESIA PRE PROCEDURE
Department of Anesthesiology  Preprocedure Note       Name:  Sienna Encarnacion   Age:  24 y.o.  :  2000                                          MRN:  [id number]         Date:  2025      Surgeon: * No surgeons listed *    Procedure: * No procedures listed *    Medications prior to admission:   Prior to Admission medications    Medication Sig Start Date End Date Taking? Authorizing Provider   Prenatal Vit-Fe Fumarate-FA (PRENATAL VITAMIN) 28-0.8 MG TABS tablet Take 1 tablet by mouth daily   Yes ProviderPattie MD   Blood Glucose Monitoring Suppl (ONETOUCH VERIO REFLECT) w/Device KIT TEST BLOOD SUGARS FOUR TIMES A DAY. FIRST TIME FASTING AND THEN 2HRS AFTER MEALS 2/10/25   Pattie Jensen MD   ONETOUCH VERIO strip  4/3/25   Pattie Jensen MD   Lancets (ONETOUCH DELICA PLUS [id number]) MISC  4/3/25   Pattie Jensen MD       Current medications:    Current Facility-Administered Medications   Medication Dose Route Frequency Provider Last Rate Last Admin   • miSOPROStol (CYTOTEC) pre-split tablet TABS 50 mcg  50 mcg Oral Q2H Pippa Urena MD       • fentaNYL 2 mcg/mL BUPivacaine 0.125% in sodium chloride 0.9% 100 mL epidural infusion  12 mL/hr Epidural Continuous John Casanova MD       • naloxone 0.4 mg in 10 mL sodium chloride syringe   IntraVENous PRN John Casanova MD       • ondansetron (ZOFRAN) injection 4 mg  4 mg IntraVENous Q6H PRN John Casanova MD       • ePHEDrine injection 10 mg  10 mg IntraVENous Q5 Min PRN John Casanova MD       • lactated ringers infusion   IntraVENous Continuous Keisha Keys MD       • lactated ringers bolus 500 mL  500 mL IntraVENous PRN Keisha Keys MD        Or   • lactated ringers bolus 1,000 mL  1,000 mL IntraVENous PRN Keisha Keys .9 mL/hr at 25 0234 1,000 mL at 25 0234   • sodium chloride flush 0.9 % injection 5-40 mL  5-40 mL IntraVENous 2 times per day Keisha Keys MD       • sodium

## 2025-04-08 NOTE — DISCHARGE SUMMARY
Obstetrical Discharge Summary     Name: Sienna Encarnacion MRN: [id number]  SSN: [id number]    YOB: 2000  Age: 24 y.o.  Sex: female      Admit Date: 4/7/2025    Discharge Date: 4/10/2025     Attending Physician:  Michelle Sampson MD     Delivering Physician:  Michelle Sampson MD and Dr. Jose Manuel Hair    * Admission Diagnoses:   IUP @ 38w4d   SROM      * Discharge Diagnoses:   Delivery of a VMI via FAVD by Jose Manuel Hair MD and Michelle Sampson MD on 4/8/2025.  Apgars were 8 and 9.      3rd degree perineal laceration      Additional Diagnoses:  No components found for: \"OBEXTABORH\", \"OBEXTABSCRN\", \"OBEXTRUBELLA\", \"OBEXTGRBS\"   There is no immunization history on file for this patient.    * Procedures:   FAVD         * Discharge Condition: good    * Hospital Course: Normal hospital course following the delivery.    * Disposition: Home    Discharge Medications:      Medication List        CONTINUE taking these medications      OneTouch Delica Plus [id number] Misc     OneTouch Verio Reflect w/Device Kit            ASK your doctor about these medications      OneTouch Verio strip  Generic drug: blood glucose test strips     prenatal vitamin 28-0.8 MG Tabs tablet              * Follow-up Care/Patient Instructions:  Activity: Activity as tolerated  Diet: Regular Diet  Wound Care: As directed      Followup 6 weeks for PP check        Signed By:  Michelle Sampson MD     April 8, 2025

## 2025-04-08 NOTE — L&D DELIVERY NOTE
Forceps Delivery Procedure Note    Delivery Physician:  Jose Manuel Hair MD    Indication: Maternal exhaustion    Relevant history:  24 y.o.  pushing for 2 hours without descent in the past hour.    Fetal rate category: Category 2    Contraindication:None    EGA: 38w4d    EFW: 8 pounds    Pelvis: Adequate for vaginal delivery    Cervix: Fully dilated and retracted    Membranes: Ruptured    Amniotic Fluid: Clear    Abdominal exam amount of fetal head palpable above pelvic brim (\"fifths\"):0    Is the fetal head engaged? Yes    Station: +3    Angle of Progression: 154 Degrees = Station 3.5 cm    Fetal head position: Right occiput anterior    Asynclitism: Anterior    Caput: +3    Molding:+2 parietal bones are overlapped, but easily reduced     Fetal spine location: Maternal right    Fetal position confirmed with ultrasound: Yes    Location for procedure: Delivery room    Anesthesia: Epidural    Maternal bladder emptied: Yes    Forceps type:Shaka-Nandini    Forceps classification: Low    Number of pulls: 2    Rotation of head: None    Traction: Moderate    Maternal effort: Moderate    Total time forceps applied to fetal head: 0 minutes 45 seconds    Successful: Yes    Shoulder dystocia: No    Birth: Baby Weight:   Information for the patient's :  Elizabeth Encarnacion [[id number]]   Birth Weight: 4.01 kg (8 lb 13.5 oz)    Baby Apgar 1 min:   Information for the patient's :  Elizabeth Encarnacion [[id number]]   APGAR One: 8    Baby Apgar 5 min:   Information for the patient's :  Elizabeth Encarnacion [[id number]]   APGAR Five: 9    Baby :   Information for the patient's :  Elizabeth Encarnacion [[id number]]   2025    Baby Type of Delivery:   Information for the patient's :  Elizabeth Encarnacion [[id number]]   Delivery Method: Vaginal, Forceps    Baby Gender:   Information for the patient's :  Elizabeth Encarnacion [[id number]]   male    Child's Time of  Birth: [unfilled]    Baby gender: Male    Birth Weight: 4010 grams    Apgar - One Minute: 8    Apgar - Five Minutes: 9    Umbilical Cord: 3 vessels present    Placenta Removal: Expressed    Placenta Appearance: normal    Specimens: None    [place] exam: Normal    Episiotomy:none     Lacerations:  Third degree perineal 3B    Laceration(s) repair: Yes    Suture used for repair: 2 - 0 PDS for the external sphincter and 2 - 0 Vicryl for the second degree repair    Complications:  None    EBL: 300 mls    QBL: 300 mls    I applied the forceps and brought the baby to [facility].  Dr. Sampson completed the delivery.  I repaired the external sphincter with an end to end repair with 2 - 0 PDS and Dr. Sampson completed the repair.    Signed By:  Jose Manuel Hair MD     2025

## 2025-04-08 NOTE — PROGRESS NOTES
Labor Progress Note  Patient seen, fetal heart rate and contraction pattern evaluated, patient examined. Comfortable with epidural.    Patient Vitals for the past 2 hrs:   BP Pulse SpO2   25 0740 120/67 97 98 %   25 0735 -- (!) 102 98 %   25 0731 -- (!) 105 94 %   25 0730 -- 89 96 %   25 0726 (!) 111/59 (!) 104 --   25 0725 -- (!) 101 96 %   25 0720 -- 83 97 %   25 0715 -- 87 96 %   25 0710 115/67 (!) 101 97 %   25 0705 -- 100 95 %   25 0700 -- (!) 111 98 %   25 0655 -- -- 96 %   25 0654 119/77 (!) 102 --   25 0650 -- (!) 106 98 %   25 0645 -- (!) 109 97 %   25 0640 (!) 98/55 96 98 %       Physical Exam:  Cervical Exam:  5/80/-2  Uterine Activity: q 2 minutes on pitocin 8mu  Fetal Heart Rate: category I, baseline 120bpm    Assessment/Plan:  Reassuring fetal status  Continuing to make appropriate cervical change   Continue current management. Anticipate .    Michelle Sampson MD

## 2025-04-08 NOTE — PROGRESS NOTES
2220 Per BETI Mares CNM pt okay to ambulate in the hallway for an hour. NST reactive.    0005 Dr. Urena updated pt ctx have spaced out and pt not feeling them. Order received for misoprostol. Pt in agreement with augmentation plan.    0235 Pt requesting epidural. IVF bolus initiated. Will notify anesthesia.    0310 Dr. Casanova at the bedside for epidural placement.    0314 Epidural timeout    0320 Test dose    0325 Loading dose

## 2025-04-09 PROCEDURE — 1120000000 HC RM PRIVATE OB

## 2025-04-09 PROCEDURE — 6370000000 HC RX 637 (ALT 250 FOR IP): Performed by: STUDENT IN AN ORGANIZED HEALTH CARE EDUCATION/TRAINING PROGRAM

## 2025-04-09 PROCEDURE — 94761 N-INVAS EAR/PLS OXIMETRY MLT: CPT

## 2025-04-09 RX ADMIN — ACETAMINOPHEN 1000 MG: 500 TABLET ORAL at 09:33

## 2025-04-09 RX ADMIN — DOCUSATE SODIUM 100 MG: 100 CAPSULE, LIQUID FILLED ORAL at 09:33

## 2025-04-09 RX ADMIN — DOCUSATE SODIUM 100 MG: 100 CAPSULE, LIQUID FILLED ORAL at 20:10

## 2025-04-09 RX ADMIN — IBUPROFEN 800 MG: 800 TABLET, FILM COATED ORAL at 01:55

## 2025-04-09 RX ADMIN — IBUPROFEN 800 MG: 800 TABLET, FILM COATED ORAL at 09:33

## 2025-04-09 RX ADMIN — SIMETHICONE 80 MG: 80 TABLET, CHEWABLE ORAL at 09:36

## 2025-04-09 RX ADMIN — POLYETHYLENE GLYCOL 3350 17 G: 17 POWDER, FOR SOLUTION ORAL at 09:33

## 2025-04-09 RX ADMIN — ACETAMINOPHEN 1000 MG: 500 TABLET ORAL at 17:50

## 2025-04-09 RX ADMIN — ACETAMINOPHEN 1000 MG: 500 TABLET ORAL at 01:55

## 2025-04-09 RX ADMIN — SIMETHICONE 80 MG: 80 TABLET, CHEWABLE ORAL at 02:00

## 2025-04-09 RX ADMIN — IBUPROFEN 800 MG: 800 TABLET, FILM COATED ORAL at 17:50

## 2025-04-09 ASSESSMENT — PAIN DESCRIPTION - DESCRIPTORS
DESCRIPTORS: SORE
DESCRIPTORS: ACHING

## 2025-04-09 ASSESSMENT — PAIN - FUNCTIONAL ASSESSMENT: PAIN_FUNCTIONAL_ASSESSMENT: ACTIVITIES ARE NOT PREVENTED

## 2025-04-09 ASSESSMENT — PAIN SCALES - GENERAL
PAINLEVEL_OUTOF10: 7
PAINLEVEL_OUTOF10: 4
PAINLEVEL_OUTOF10: 8

## 2025-04-09 ASSESSMENT — PAIN DESCRIPTION - LOCATION
LOCATION: PERINEUM
LOCATION: ABDOMEN

## 2025-04-09 ASSESSMENT — PAIN DESCRIPTION - ORIENTATION: ORIENTATION: LOWER

## 2025-04-09 NOTE — LACTATION NOTE
This note was copied from a baby's chart.  Mother attempting to feed baby and baby fussy/frantic at breast.   Approx 0.6 mls of EBM offered to infant via syringe.  Parents shown how to offer gloved finger so baby can associate initiated independent suck with milk flow.  Infant calms and shows emerging cues to eat.   Infant placed skin to skin with 20mm shield and approx 1 ml of EBM administered into shield.  Infant begins sucking more vigorously and continuously.  Mother expresses relief as sustained rhythmic suck is realized.  Baby has previously not demonstrated more than 1/2 suckles.   When infant pauses, parents shown how to wake baby to resume nursing.   Infant remains nursing for 7 minutes while IBCLC present and continues past end of consult.  Mother encouraged to offer next feed without nipple shield.

## 2025-04-09 NOTE — PROGRESS NOTES
PostPartum Note    Sienna Encarnacion  [id number]  2000  24 y.o.    S:  Ms. Sienna Encarnacion is a 24 y.o.  PPD #1 s/p FAVD @ 38w4d.  Doing well.  She had a baby boy.  Her lochia is like a period.  She describes her pain as mild and is well controlled with PO medications.  She is breast feeding.  Tolerating PO intake.      Ayala has been in place since delivery.     O:   /87   Pulse 87   Temp 97.7 °F (36.5 °C) (Oral)   Resp 20   Ht 1.676 m (5' 6\")   Wt 86.5 kg (190 lb 9.6 oz)   SpO2 100%   Breastfeeding Unknown   BMI 30.76 kg/m²     Lab Results   Component Value Date/Time    WBC 9.3 2025 05:00 PM    HGB 13.7 2025 05:00 PM    HCT 40.2 2025 05:00 PM     2025 05:00 PM    MCV 89.7 2025 05:00 PM       Gen - No acute distress  Abdomen - Fundus firm, below the umbilicus   Ext - Warm, well perfused.  Nontender    A/P:  PPD #1 s/p FAVD @ 38w4d doing well.    1.  Routine PP instructions/ care discussed   - 3rd degree laceration - continue strict bowel regimen/perineal care.  S/p Abx ppx   - GDM - will stop accuchecks now.  F/U PP  2.  Blood type - Rh +  3.  Circumcision desired - will plan later this PM    4.  Discharge PPD#2    5.  F/U 1-2 weeks for PP check.      Miriam Hay MD  [facility]

## 2025-04-09 NOTE — LACTATION NOTE
This note was copied from a baby's chart.  Mother states baby has been sleepy and she has been unable to elicit more than 1-2 suckles.  She has been providing syringes of colostrum harvested prenatally (1-2 mls at a time).  Infant brought to mother in a modified laid back/cross cradle position.  Infant agitated fussy and crying.  Demonstrated to mother how to settle baby by rocking/skin to skin and latch reattempted.  Infant will open mouth and fall asleep at breast.  Nipple shield (20mm introduced) and infant able to latch and suckle 4-5 times before falling asleep - unable to rouse.  Mother has been pumping when providing syringed milk but states she is not removing much milk.  Mother encouraged to use hand expression and technique demonstrated to mother.  Copious drops of colostrum easily expressed.  5 now fed to infant while sleeping.   Mother counseled that at 24 hours infant may require additional volumes for supplementation if not breastfeeding well and education provided regarding Human Donor Milk.  Mother will attempt next feed with hand expressed drops prior to latch and utilization of shield.       Discussed with mother her plan for feeding.  Reviewed the benefits of exclusive breast milk feeding during the hospital stay.   Informed her of the risks of using formula to supplement in the first few days of life as well as the benefits of successful breast milk feeding; referred her to the Breastfeeding booklet about this information.   She acknowledges understanding of information reviewed and states that it is her plan to breastfeed her infant.  Will support her choice and offer additional information as needed.     Reviewed breastfeeding basics:  How milk is made and normal  breastfeeding behaviors discussed.  Supply and demand,  stomach size, early feeding cues, skin to skin bonding with comfortable positioning and baby led latch-on reviewed.  How to identify signs of successful  10-20 drops of hand expressed colostrum at any non-feeds.      Left Breast: Soft  Left Nipple: Protrude with stimulation (short)  Right Nipple: Protrude with stimulation (short)  Right Breast: Soft  Position and Latch: With assistance  Signs of Transfer: Non-nutritive sucking  Maternal Response: Attentive,  Comfortable with position  Infant Supplementation: Syringe, Finger Fed, Expressed Breast Milk, Per Mother Request        Latch: Repeated attempts, hold nipple in mouth, stimulate to suck  Audible Swallowing: None  Type of Nipple: Everted (after stimulation)  Comfort (Breast/Nipple): Soft/non-tender  Hold (Positioning): Full assist, teach one side, mother does other, staff holds  LATCH Score: 6  Breast Care: Nipple shield, Using breast pump  Care Plan Initiated: Reluctant nurser  Lactation Comment: Mother states baby has been sleepy at breast and she has been feeding 1.0 ml syringes of colostrum - collected prenatally , care plan initiated

## 2025-04-09 NOTE — PLAN OF CARE
Problem: Chronic Conditions and Co-morbidities  Goal: Patient's chronic conditions and co-morbidity symptoms are monitored and maintained or improved  4/9/2025 0308 by Quin Brito RN  Outcome: Progressing  4/9/2025 0305 by Quin Brito RN  Outcome: Progressing  Flowsheets (Taken 4/8/2025 2030)  Care Plan - Patient's Chronic Conditions and Co-Morbidity Symptoms are Monitored and Maintained or Improved:   Monitor and assess patient's chronic conditions and comorbid symptoms for stability, deterioration, or improvement   Collaborate with multidisciplinary team to address chronic and comorbid conditions and prevent exacerbation or deterioration   Update acute care plan with appropriate goals if chronic or comorbid symptoms are exacerbated and prevent overall improvement and discharge     Problem: Pain  Goal: Verbalizes/displays adequate comfort level or baseline comfort level  4/9/2025 0308 by Quin Brito RN  Outcome: Progressing  4/9/2025 0305 by Quin Brito RN  Outcome: Progressing  Flowsheets (Taken 4/8/2025 2030)  Verbalizes/displays adequate comfort level or baseline comfort level: Encourage patient to monitor pain and request assistance     Problem: Respiratory - Adult  Goal: Achieves optimal ventilation and oxygenation  4/9/2025 0308 by Quin Brito RN  Outcome: Progressing  4/9/2025 0305 by Quin Brito RN  Outcome: Progressing     Problem: Cardiovascular - Adult  Goal: Maintains optimal cardiac output and hemodynamic stability  4/9/2025 0308 by Quin Brito RN  Outcome: Progressing  4/9/2025 0305 by Quin Brito RN  Outcome: Progressing     Problem: Skin/Tissue Integrity - Adult  Goal: Skin integrity remains intact  4/9/2025 0308 by Quin Brito RN  Outcome: Progressing  4/9/2025 0305 by Quin Brito RN  Outcome: Progressing     Problem: Gastrointestinal - Adult  Goal: Minimal or absence of nausea and vomiting  4/9/2025 0308 by Quin Brito

## 2025-04-09 NOTE — CARE COORDINATION
4/9/2025  12:31 PM    Care Management Progress Note    Reason for Admission:   Full-term premature rupture of membranes [O42.92]      Patient Admission Status: Inpatient    Transition of care plan:    CM met with SYED to complete initial assessment and begin discharge planning.  MOB verified and confirmed demographics.  SYED lives with family, at the address on file. SYED reports she has good family support, and feels like she has the support she needs when she returns home.  SYED plans to breast feed baby and has pump to use at home.  Peds list provided to SYED . SYED has car seat, bassinet/crib, clothing, bottles and all necessary supplies for baby.        04/09/25 1230   Service Assessment   Patient Orientation Alert and Oriented   Cognition Alert   History Provided By Patient   Primary Caregiver Self   Support Systems Family Members;Spouse/Significant Other   PCP Verified by CM Yes   Last Visit to PCP Within last 3 months   Prior Functional Level Independent in ADLs/IADLs   Current Functional Level Independent in ADLs/IADLs   Can patient return to prior living arrangement Yes   Ability to make needs known: Good   Family able to assist with home care needs: Yes   Would you like for me to discuss the discharge plan with any other family members/significant others, and if so, who? No   Financial Resources Medicaid     John Krueger CM

## 2025-04-10 VITALS
BODY MASS INDEX: 30.63 KG/M2 | WEIGHT: 190.6 LBS | SYSTOLIC BLOOD PRESSURE: 106 MMHG | HEIGHT: 66 IN | DIASTOLIC BLOOD PRESSURE: 68 MMHG | TEMPERATURE: 97.9 F | RESPIRATION RATE: 16 BRPM | OXYGEN SATURATION: 98 % | HEART RATE: 67 BPM

## 2025-04-10 PROCEDURE — 94761 N-INVAS EAR/PLS OXIMETRY MLT: CPT

## 2025-04-10 PROCEDURE — 6370000000 HC RX 637 (ALT 250 FOR IP): Performed by: STUDENT IN AN ORGANIZED HEALTH CARE EDUCATION/TRAINING PROGRAM

## 2025-04-10 RX ORDER — POLYETHYLENE GLYCOL 3350 17 G/17G
17 POWDER, FOR SOLUTION ORAL DAILY
Qty: 30 PACKET | Refills: 0 | Status: SHIPPED | OUTPATIENT
Start: 2025-04-11 | End: 2025-05-11

## 2025-04-10 RX ORDER — PSEUDOEPHEDRINE HCL 30 MG
100 TABLET ORAL 2 TIMES DAILY
Qty: 60 CAPSULE | Refills: 0 | Status: SHIPPED | OUTPATIENT
Start: 2025-04-10

## 2025-04-10 RX ADMIN — IBUPROFEN 800 MG: 800 TABLET, FILM COATED ORAL at 09:55

## 2025-04-10 RX ADMIN — IBUPROFEN 800 MG: 800 TABLET, FILM COATED ORAL at 01:49

## 2025-04-10 RX ADMIN — ACETAMINOPHEN 1000 MG: 500 TABLET ORAL at 01:49

## 2025-04-10 RX ADMIN — DOCUSATE SODIUM 100 MG: 100 CAPSULE, LIQUID FILLED ORAL at 07:43

## 2025-04-10 RX ADMIN — ACETAMINOPHEN 1000 MG: 500 TABLET ORAL at 09:56

## 2025-04-10 RX ADMIN — POLYETHYLENE GLYCOL 3350 17 G: 17 POWDER, FOR SOLUTION ORAL at 07:43

## 2025-04-10 ASSESSMENT — PAIN DESCRIPTION - LOCATION
LOCATION: PERINEUM
LOCATION: PERINEUM

## 2025-04-10 ASSESSMENT — PAIN DESCRIPTION - DESCRIPTORS: DESCRIPTORS: BURNING;SORE

## 2025-04-10 ASSESSMENT — PAIN DESCRIPTION - ORIENTATION: ORIENTATION: MID

## 2025-04-10 ASSESSMENT — PAIN SCALES - GENERAL
PAINLEVEL_OUTOF10: 5
PAINLEVEL_OUTOF10: 7

## 2025-04-10 NOTE — PROGRESS NOTES
PostPartum Note    Sienna Encarnacion  [id number]  2000  24 y.o.    S:  Ms. Sienna Encarnacion is a 24 y.o.  PPD #2 s/p FAVD @ 38w4d.  Doing well.  She had a baby boy.  Her lochia is like a period.  She describes her pain as mild and is well controlled with PO medications.  She is breast feeding and this is going well.  She is ambulating and voiding.  Tolerating PO intake.      O:   /68   Pulse 67   Temp 97.9 °F (36.6 °C) (Oral)   Resp 16   Ht 1.676 m (5' 6\")   Wt 86.5 kg (190 lb 9.6 oz)   SpO2 98%   Breastfeeding Unknown   BMI 30.76 kg/m²     Lab Results   Component Value Date/Time    WBC 9.3 2025 05:00 PM    HGB 13.7 2025 05:00 PM    HCT 40.2 2025 05:00 PM     2025 05:00 PM    MCV 89.7 2025 05:00 PM       Gen - No acute distress  Abdomen - Fundus firm, below the umbilicus   Ext - Warm, well perfused.  Nontender    A/P:  PPD #2 s/p FAVD @ 38w4d doing well.      3rd degree perineal laceration   GDM     1.  Routine PP instructions/ care discussed  2.  Blood type - Rh pos  3.  Rubella NON- immune -MMR ordered  4.  Circumcision desired -consent reviewed  5.  Discharge today    6.  F/U 4-6 weeks for PP check.      Betsy Tavarez, DO  [facility]

## 2025-04-10 NOTE — LACTATION NOTE
This note was copied from a baby's chart.  Baby in nursery for circ during lactation consult.  Mother chose to initiate blend feeding overnight. She continues to utilize nipple shield with success and is offering drops of EBM for supplementation post feeds. Reviewed ways to facilitate moving between breast and bottle, including paced feeding.  Parents verbalize understanding.  Preemie nipple to bedside with instruction.  Discharge instructions reviewed as well as expectations for first few weeks of life.  Community resources shared.     Pt chooses to do both breast and bottle.  Discussed effects of early supplementation on breastfeeding success; may decrease breastmilk production and supply, increase risk for pathological engorgement, baby may develop preference for faster flow from bottles vs breast, and baby's stomach can be stretched if larger volumes of formula are given.    Reviewed with mother it is common for your baby to be sleepy after circumcision. Reminded mother to wake baby to eat if baby sleeps longer than 2 to 3 hours since the last feeding. Hand expression to offer drops of colostrum to entice to latch is recommended. Skin to skin and comfort measures encouraged.      Babies who are held skin-to-skin are more stable, physiologically, than their peers who are placed in a warmer after birth. Skin to skin calms and relaxes both mother and baby, regulates the baby's heart rate temperature and breathing, helping them to better adapt to life outside the womb.  Skin to skin also stimulates digestion and an interest in feeding.  Mother's who practice consistent skin to skin experience more positive breastfeeding, improved breast milk production, and are likely to have reduced postpartum bleeding and lower risk of postpartum depression. Once you are home with your baby, it’s still beneficial to make this practice a part of your day.    Pt taught that Paced Bottle Feeding is a method of bottle feeding that  return to birth wt within 2 weeks.  Follow up with pediatrician visit for weight check in 1-2 days (per AAP guidelines.)  Encouraged to call Warm Line  [phone]  for any questions/problems that arise. Mother also given breastfeeding support group dates and times for any future needs